# Patient Record
Sex: FEMALE | Race: BLACK OR AFRICAN AMERICAN | NOT HISPANIC OR LATINO | Employment: FULL TIME | URBAN - METROPOLITAN AREA
[De-identification: names, ages, dates, MRNs, and addresses within clinical notes are randomized per-mention and may not be internally consistent; named-entity substitution may affect disease eponyms.]

---

## 2022-02-17 ENCOUNTER — APPOINTMENT (OUTPATIENT)
Dept: GENERAL RADIOLOGY | Facility: HOSPITAL | Age: 62
End: 2022-02-17

## 2022-02-17 ENCOUNTER — HOSPITAL ENCOUNTER (INPATIENT)
Facility: HOSPITAL | Age: 62
LOS: 1 days | Discharge: HOME | End: 2022-02-18
Attending: EMERGENCY MEDICINE | Admitting: INTERNAL MEDICINE

## 2022-02-17 ENCOUNTER — APPOINTMENT (OUTPATIENT)
Dept: CARDIOLOGY | Facility: HOSPITAL | Age: 62
End: 2022-02-17

## 2022-02-17 DIAGNOSIS — I44.2 AV BLOCK, 3RD DEGREE: ICD-10-CM

## 2022-02-17 DIAGNOSIS — I44.2 COMPLETE HEART BLOCK: Primary | ICD-10-CM

## 2022-02-17 LAB
ALBUMIN SERPL-MCNC: 5 G/DL (ref 3.5–5.2)
ALBUMIN/GLOB SERPL: 1.6 G/DL
ALP SERPL-CCNC: 118 U/L (ref 39–117)
ALT SERPL W P-5'-P-CCNC: 23 U/L (ref 1–33)
ANION GAP SERPL CALCULATED.3IONS-SCNC: 11.1 MMOL/L (ref 5–15)
APTT PPP: 27.8 SECONDS (ref 22.7–35.4)
ASCENDING AORTA: 2.2 CM
AST SERPL-CCNC: 17 U/L (ref 1–32)
BASOPHILS # BLD AUTO: 0.03 10*3/MM3 (ref 0–0.2)
BASOPHILS NFR BLD AUTO: 0.5 % (ref 0–1.5)
BH CV ECHO MEAS - ACS: 2.2 CM
BH CV ECHO MEAS - AO MAX PG (FULL): 6 MMHG
BH CV ECHO MEAS - AO MAX PG: 8.9 MMHG
BH CV ECHO MEAS - AO MEAN PG (FULL): 2.8 MMHG
BH CV ECHO MEAS - AO MEAN PG: 4.2 MMHG
BH CV ECHO MEAS - AO ROOT AREA (BSA CORRECTED): 1.5
BH CV ECHO MEAS - AO ROOT AREA: 3.9 CM^2
BH CV ECHO MEAS - AO ROOT DIAM: 2.2 CM
BH CV ECHO MEAS - AO V2 MAX: 149.3 CM/SEC
BH CV ECHO MEAS - AO V2 MEAN: 96.9 CM/SEC
BH CV ECHO MEAS - AO V2 VTI: 36.1 CM
BH CV ECHO MEAS - ASC AORTA: 2.2 CM
BH CV ECHO MEAS - AVA(I,A): 1.8 CM^2
BH CV ECHO MEAS - AVA(I,D): 1.8 CM^2
BH CV ECHO MEAS - AVA(V,A): 1.8 CM^2
BH CV ECHO MEAS - AVA(V,D): 1.8 CM^2
BH CV ECHO MEAS - BSA(HAYCOCK): 1.5 M^2
BH CV ECHO MEAS - BSA: 1.5 M^2
BH CV ECHO MEAS - BZI_BMI: 27.3 KILOGRAMS/M^2
BH CV ECHO MEAS - BZI_METRIC_HEIGHT: 144.8 CM
BH CV ECHO MEAS - BZI_METRIC_WEIGHT: 57.2 KG
BH CV ECHO MEAS - EDV(CUBED): 73.6 ML
BH CV ECHO MEAS - EDV(MOD-SP2): 53 ML
BH CV ECHO MEAS - EDV(MOD-SP4): 52 ML
BH CV ECHO MEAS - EDV(TEICH): 78.2 ML
BH CV ECHO MEAS - EF(CUBED): 63.3 %
BH CV ECHO MEAS - EF(MOD-BP): 67.7 %
BH CV ECHO MEAS - EF(MOD-SP2): 69.8 %
BH CV ECHO MEAS - EF(MOD-SP4): 65.4 %
BH CV ECHO MEAS - EF(TEICH): 55.2 %
BH CV ECHO MEAS - ESV(CUBED): 27 ML
BH CV ECHO MEAS - ESV(MOD-SP2): 16 ML
BH CV ECHO MEAS - ESV(MOD-SP4): 18 ML
BH CV ECHO MEAS - ESV(TEICH): 35 ML
BH CV ECHO MEAS - FS: 28.4 %
BH CV ECHO MEAS - IVS/LVPW: 0.93
BH CV ECHO MEAS - IVSD: 0.88 CM
BH CV ECHO MEAS - LA DIMENSION: 2.7 CM
BH CV ECHO MEAS - LA/AO: 1.2
BH CV ECHO MEAS - LAT PEAK E' VEL: 14.6 CM/SEC
BH CV ECHO MEAS - LV DIASTOLIC VOL/BSA (35-75): 35.2 ML/M^2
BH CV ECHO MEAS - LV MASS(C)D: 121.4 GRAMS
BH CV ECHO MEAS - LV MASS(C)DI: 82.1 GRAMS/M^2
BH CV ECHO MEAS - LV MAX PG: 2.9 MMHG
BH CV ECHO MEAS - LV MEAN PG: 1.4 MMHG
BH CV ECHO MEAS - LV SYSTOLIC VOL/BSA (12-30): 12.2 ML/M^2
BH CV ECHO MEAS - LV V1 MAX: 85.3 CM/SEC
BH CV ECHO MEAS - LV V1 MEAN: 55.6 CM/SEC
BH CV ECHO MEAS - LV V1 VTI: 21.4 CM
BH CV ECHO MEAS - LVIDD: 4.2 CM
BH CV ECHO MEAS - LVIDS: 3 CM
BH CV ECHO MEAS - LVLD AP2: 5.8 CM
BH CV ECHO MEAS - LVLD AP4: 6 CM
BH CV ECHO MEAS - LVLS AP2: 4.6 CM
BH CV ECHO MEAS - LVLS AP4: 4.6 CM
BH CV ECHO MEAS - LVOT AREA (M): 3.1 CM^2
BH CV ECHO MEAS - LVOT AREA: 3.1 CM^2
BH CV ECHO MEAS - LVOT DIAM: 2 CM
BH CV ECHO MEAS - LVPWD: 0.95 CM
BH CV ECHO MEAS - MED PEAK E' VEL: 12.7 CM/SEC
BH CV ECHO MEAS - MV A MAX VEL: 92.1 CM/SEC
BH CV ECHO MEAS - MV DEC SLOPE: 581 CM/SEC^2
BH CV ECHO MEAS - MV DEC TIME: 108 SEC
BH CV ECHO MEAS - MV E MAX VEL: 87.8 CM/SEC
BH CV ECHO MEAS - MV E/A: 0.95
BH CV ECHO MEAS - MV MAX PG: 4.6 MMHG
BH CV ECHO MEAS - MV MEAN PG: 1.6 MMHG
BH CV ECHO MEAS - MV P1/2T MAX VEL: 112.7 CM/SEC
BH CV ECHO MEAS - MV P1/2T: 56.8 MSEC
BH CV ECHO MEAS - MV V2 MAX: 107.2 CM/SEC
BH CV ECHO MEAS - MV V2 MEAN: 55.9 CM/SEC
BH CV ECHO MEAS - MV V2 VTI: 50.3 CM
BH CV ECHO MEAS - MVA P1/2T LCG: 2 CM^2
BH CV ECHO MEAS - MVA(P1/2T): 3.9 CM^2
BH CV ECHO MEAS - MVA(VTI): 1.3 CM^2
BH CV ECHO MEAS - PA ACC TIME: 0.15 SEC
BH CV ECHO MEAS - PA MAX PG (FULL): 2 MMHG
BH CV ECHO MEAS - PA MAX PG: 4.2 MMHG
BH CV ECHO MEAS - PA PR(ACCEL): 11.3 MMHG
BH CV ECHO MEAS - PA V2 MAX: 102 CM/SEC
BH CV ECHO MEAS - PVA(V,A): 3.4 CM^2
BH CV ECHO MEAS - PVA(V,D): 3.4 CM^2
BH CV ECHO MEAS - QP/QS: 1.2
BH CV ECHO MEAS - RAP SYSTOLE: 3 MMHG
BH CV ECHO MEAS - RV MAX PG: 2.1 MMHG
BH CV ECHO MEAS - RV MEAN PG: 0.9 MMHG
BH CV ECHO MEAS - RV V1 MAX: 72.8 CM/SEC
BH CV ECHO MEAS - RV V1 MEAN: 43.1 CM/SEC
BH CV ECHO MEAS - RV V1 VTI: 17 CM
BH CV ECHO MEAS - RVOT AREA: 4.8 CM^2
BH CV ECHO MEAS - RVOT DIAM: 2.5 CM
BH CV ECHO MEAS - RVSP: 31.6 MMHG
BH CV ECHO MEAS - SI(AO): 95.2 ML/M^2
BH CV ECHO MEAS - SI(CUBED): 31.5 ML/M^2
BH CV ECHO MEAS - SI(LVOT): 44.5 ML/M^2
BH CV ECHO MEAS - SI(MOD-SP2): 25 ML/M^2
BH CV ECHO MEAS - SI(MOD-SP4): 23 ML/M^2
BH CV ECHO MEAS - SI(TEICH): 29.2 ML/M^2
BH CV ECHO MEAS - SV(AO): 140.7 ML
BH CV ECHO MEAS - SV(CUBED): 46.6 ML
BH CV ECHO MEAS - SV(LVOT): 65.8 ML
BH CV ECHO MEAS - SV(MOD-SP2): 37 ML
BH CV ECHO MEAS - SV(MOD-SP4): 34 ML
BH CV ECHO MEAS - SV(RVOT): 81.1 ML
BH CV ECHO MEAS - SV(TEICH): 43.2 ML
BH CV ECHO MEAS - TAPSE (>1.6): 2 CM
BH CV ECHO MEAS - TR MAX VEL: 267.4 CM/SEC
BH CV ECHO MEASUREMENTS AVERAGE E/E' RATIO: 6.43
BH CV XLRA - RV BASE: 2.6 CM
BH CV XLRA - RV LENGTH: 5.3 CM
BH CV XLRA - RV MID: 1.8 CM
BH CV XLRA - TDI S': 11.7 CM/SEC
BILIRUB SERPL-MCNC: 0.7 MG/DL (ref 0–1.2)
BUN SERPL-MCNC: 27 MG/DL (ref 8–23)
BUN/CREAT SERPL: 26 (ref 7–25)
CALCIUM SPEC-SCNC: 10 MG/DL (ref 8.6–10.5)
CHLORIDE SERPL-SCNC: 102 MMOL/L (ref 98–107)
CO2 SERPL-SCNC: 24.9 MMOL/L (ref 22–29)
CREAT SERPL-MCNC: 1.04 MG/DL (ref 0.57–1)
DEPRECATED RDW RBC AUTO: 46.2 FL (ref 37–54)
EOSINOPHIL # BLD AUTO: 0.06 10*3/MM3 (ref 0–0.4)
EOSINOPHIL NFR BLD AUTO: 1.1 % (ref 0.3–6.2)
ERYTHROCYTE [DISTWIDTH] IN BLOOD BY AUTOMATED COUNT: 14.3 % (ref 12.3–15.4)
GFR SERPL CREATININE-BSD FRML MDRD: 65 ML/MIN/1.73
GLOBULIN UR ELPH-MCNC: 3.1 GM/DL
GLUCOSE SERPL-MCNC: 174 MG/DL (ref 65–99)
HCT VFR BLD AUTO: 46.1 % (ref 34–46.6)
HGB BLD-MCNC: 14.3 G/DL (ref 12–15.9)
IMM GRANULOCYTES # BLD AUTO: 0.02 10*3/MM3 (ref 0–0.05)
IMM GRANULOCYTES NFR BLD AUTO: 0.4 % (ref 0–0.5)
INR PPP: 1.02 (ref 0.9–1.1)
LEFT ATRIUM VOLUME INDEX: 22.7 ML/M2
LYMPHOCYTES # BLD AUTO: 0.98 10*3/MM3 (ref 0.7–3.1)
LYMPHOCYTES NFR BLD AUTO: 17.9 % (ref 19.6–45.3)
MAGNESIUM SERPL-MCNC: 2.2 MG/DL (ref 1.6–2.4)
MAXIMAL PREDICTED HEART RATE: 157 BPM
MCH RBC QN AUTO: 27.3 PG (ref 26.6–33)
MCHC RBC AUTO-ENTMCNC: 31 G/DL (ref 31.5–35.7)
MCV RBC AUTO: 88 FL (ref 79–97)
MONOCYTES # BLD AUTO: 0.4 10*3/MM3 (ref 0.1–0.9)
MONOCYTES NFR BLD AUTO: 7.3 % (ref 5–12)
NEUTROPHILS NFR BLD AUTO: 3.97 10*3/MM3 (ref 1.7–7)
NEUTROPHILS NFR BLD AUTO: 72.8 % (ref 42.7–76)
NRBC BLD AUTO-RTO: 0 /100 WBC (ref 0–0.2)
PLATELET # BLD AUTO: 205 10*3/MM3 (ref 140–450)
PMV BLD AUTO: 10.5 FL (ref 6–12)
POTASSIUM SERPL-SCNC: 3.9 MMOL/L (ref 3.5–5.2)
PROT SERPL-MCNC: 8.1 G/DL (ref 6–8.5)
PROTHROMBIN TIME: 13.3 SECONDS (ref 11.7–14.2)
QT INTERVAL: 436 MS
QT INTERVAL: 513 MS
RBC # BLD AUTO: 5.24 10*6/MM3 (ref 3.77–5.28)
SARS-COV-2 RNA RESP QL NAA+PROBE: NOT DETECTED
SINUS: 2.2 CM
SODIUM SERPL-SCNC: 138 MMOL/L (ref 136–145)
STJ: 2.2 CM
STRESS TARGET HR: 133 BPM
TROPONIN T SERPL-MCNC: <0.01 NG/ML (ref 0–0.03)
WBC NRBC COR # BLD: 5.46 10*3/MM3 (ref 3.4–10.8)

## 2022-02-17 PROCEDURE — 33208 INSRT HEART PM ATRIAL & VENT: CPT | Performed by: INTERNAL MEDICINE

## 2022-02-17 PROCEDURE — 25010000002 FENTANYL CITRATE (PF) 50 MCG/ML SOLUTION: Performed by: INTERNAL MEDICINE

## 2022-02-17 PROCEDURE — 02HK3JZ INSERTION OF PACEMAKER LEAD INTO RIGHT VENTRICLE, PERCUTANEOUS APPROACH: ICD-10-PCS | Performed by: INTERNAL MEDICINE

## 2022-02-17 PROCEDURE — 99152 MOD SED SAME PHYS/QHP 5/>YRS: CPT | Performed by: INTERNAL MEDICINE

## 2022-02-17 PROCEDURE — 99223 1ST HOSP IP/OBS HIGH 75: CPT | Performed by: NURSE PRACTITIONER

## 2022-02-17 PROCEDURE — 25010000002 MIDAZOLAM PER 1 MG: Performed by: INTERNAL MEDICINE

## 2022-02-17 PROCEDURE — C1894 INTRO/SHEATH, NON-LASER: HCPCS | Performed by: INTERNAL MEDICINE

## 2022-02-17 PROCEDURE — C1785 PMKR, DUAL, RATE-RESP: HCPCS | Performed by: INTERNAL MEDICINE

## 2022-02-17 PROCEDURE — 84484 ASSAY OF TROPONIN QUANT: CPT | Performed by: NURSE PRACTITIONER

## 2022-02-17 PROCEDURE — 83735 ASSAY OF MAGNESIUM: CPT | Performed by: EMERGENCY MEDICINE

## 2022-02-17 PROCEDURE — 85730 THROMBOPLASTIN TIME PARTIAL: CPT | Performed by: EMERGENCY MEDICINE

## 2022-02-17 PROCEDURE — 93005 ELECTROCARDIOGRAM TRACING: CPT | Performed by: NURSE PRACTITIONER

## 2022-02-17 PROCEDURE — G0378 HOSPITAL OBSERVATION PER HR: HCPCS

## 2022-02-17 PROCEDURE — 93306 TTE W/DOPPLER COMPLETE: CPT

## 2022-02-17 PROCEDURE — 93010 ELECTROCARDIOGRAM REPORT: CPT | Performed by: INTERNAL MEDICINE

## 2022-02-17 PROCEDURE — C1887 CATHETER, GUIDING: HCPCS | Performed by: INTERNAL MEDICINE

## 2022-02-17 PROCEDURE — C1898 LEAD, PMKR, OTHER THAN TRANS: HCPCS | Performed by: INTERNAL MEDICINE

## 2022-02-17 PROCEDURE — 71045 X-RAY EXAM CHEST 1 VIEW: CPT

## 2022-02-17 PROCEDURE — C1769 GUIDE WIRE: HCPCS | Performed by: INTERNAL MEDICINE

## 2022-02-17 PROCEDURE — 02H63JZ INSERTION OF PACEMAKER LEAD INTO RIGHT ATRIUM, PERCUTANEOUS APPROACH: ICD-10-PCS | Performed by: INTERNAL MEDICINE

## 2022-02-17 PROCEDURE — 80053 COMPREHEN METABOLIC PANEL: CPT | Performed by: NURSE PRACTITIONER

## 2022-02-17 PROCEDURE — 85610 PROTHROMBIN TIME: CPT | Performed by: NURSE PRACTITIONER

## 2022-02-17 PROCEDURE — 25010000002 VANCOMYCIN PER 500 MG: Performed by: INTERNAL MEDICINE

## 2022-02-17 PROCEDURE — 99153 MOD SED SAME PHYS/QHP EA: CPT | Performed by: INTERNAL MEDICINE

## 2022-02-17 PROCEDURE — 0JH606Z INSERTION OF PACEMAKER, DUAL CHAMBER INTO CHEST SUBCUTANEOUS TISSUE AND FASCIA, OPEN APPROACH: ICD-10-PCS | Performed by: INTERNAL MEDICINE

## 2022-02-17 PROCEDURE — 93306 TTE W/DOPPLER COMPLETE: CPT | Performed by: INTERNAL MEDICINE

## 2022-02-17 PROCEDURE — 99284 EMERGENCY DEPT VISIT MOD MDM: CPT

## 2022-02-17 PROCEDURE — U0003 INFECTIOUS AGENT DETECTION BY NUCLEIC ACID (DNA OR RNA); SEVERE ACUTE RESPIRATORY SYNDROME CORONAVIRUS 2 (SARS-COV-2) (CORONAVIRUS DISEASE [COVID-19]), AMPLIFIED PROBE TECHNIQUE, MAKING USE OF HIGH THROUGHPUT TECHNOLOGIES AS DESCRIBED BY CMS-2020-01-R: HCPCS | Performed by: EMERGENCY MEDICINE

## 2022-02-17 PROCEDURE — 85025 COMPLETE CBC W/AUTO DIFF WBC: CPT | Performed by: NURSE PRACTITIONER

## 2022-02-17 PROCEDURE — 93005 ELECTROCARDIOGRAM TRACING: CPT

## 2022-02-17 DEVICE — IMPLANTABLE DEVICE: Type: IMPLANTABLE DEVICE | Status: FUNCTIONAL

## 2022-02-17 DEVICE — LD PM CAPSUREFIX NOVUS5076 52CM: Type: IMPLANTABLE DEVICE | Status: FUNCTIONAL

## 2022-02-17 DEVICE — GEN PM AZURE XT SURESCAN DR MRI: Type: IMPLANTABLE DEVICE | Status: FUNCTIONAL

## 2022-02-17 RX ORDER — ACETAMINOPHEN 650 MG/1
650 SUPPOSITORY RECTAL EVERY 4 HOURS PRN
Status: DISCONTINUED | OUTPATIENT
Start: 2022-02-17 | End: 2022-02-18 | Stop reason: HOSPADM

## 2022-02-17 RX ORDER — MIDAZOLAM HYDROCHLORIDE 1 MG/ML
INJECTION INTRAMUSCULAR; INTRAVENOUS AS NEEDED
Status: DISCONTINUED | OUTPATIENT
Start: 2022-02-17 | End: 2022-02-17 | Stop reason: HOSPADM

## 2022-02-17 RX ORDER — VANCOMYCIN HYDROCHLORIDE 1 G/200ML
INJECTION, SOLUTION INTRAVENOUS CONTINUOUS PRN
Status: COMPLETED | OUTPATIENT
Start: 2022-02-17 | End: 2022-02-17

## 2022-02-17 RX ORDER — LIDOCAINE HYDROCHLORIDE AND EPINEPHRINE 10; 10 MG/ML; UG/ML
INJECTION, SOLUTION INFILTRATION; PERINEURAL AS NEEDED
Status: DISCONTINUED | OUTPATIENT
Start: 2022-02-17 | End: 2022-02-17 | Stop reason: HOSPADM

## 2022-02-17 RX ORDER — SODIUM CHLORIDE 0.9 % (FLUSH) 0.9 %
10 SYRINGE (ML) INJECTION EVERY 12 HOURS SCHEDULED
Status: DISCONTINUED | OUTPATIENT
Start: 2022-02-17 | End: 2022-02-18 | Stop reason: HOSPADM

## 2022-02-17 RX ORDER — LISINOPRIL 20 MG/1
20 TABLET ORAL DAILY
Status: DISCONTINUED | OUTPATIENT
Start: 2022-02-17 | End: 2022-02-18 | Stop reason: HOSPADM

## 2022-02-17 RX ORDER — NALOXONE HCL 0.4 MG/ML
0.4 VIAL (ML) INJECTION
Status: DISCONTINUED | OUTPATIENT
Start: 2022-02-17 | End: 2022-02-18 | Stop reason: HOSPADM

## 2022-02-17 RX ORDER — HYDROCHLOROTHIAZIDE 25 MG/1
25 TABLET ORAL DAILY
Status: DISCONTINUED | OUTPATIENT
Start: 2022-02-17 | End: 2022-02-18 | Stop reason: HOSPADM

## 2022-02-17 RX ORDER — HYDROCODONE BITARTRATE AND ACETAMINOPHEN 5; 325 MG/1; MG/1
1 TABLET ORAL EVERY 4 HOURS PRN
Status: DISCONTINUED | OUTPATIENT
Start: 2022-02-17 | End: 2022-02-18 | Stop reason: HOSPADM

## 2022-02-17 RX ORDER — HYDROCHLOROTHIAZIDE 25 MG/1
25 TABLET ORAL DAILY
COMMUNITY

## 2022-02-17 RX ORDER — HYDROMORPHONE HYDROCHLORIDE 1 MG/ML
0.5 INJECTION, SOLUTION INTRAMUSCULAR; INTRAVENOUS; SUBCUTANEOUS
Status: DISCONTINUED | OUTPATIENT
Start: 2022-02-17 | End: 2022-02-18 | Stop reason: HOSPADM

## 2022-02-17 RX ORDER — ACETAMINOPHEN 325 MG/1
650 TABLET ORAL EVERY 4 HOURS PRN
Status: DISCONTINUED | OUTPATIENT
Start: 2022-02-17 | End: 2022-02-18 | Stop reason: HOSPADM

## 2022-02-17 RX ORDER — GLIPIZIDE 5 MG/1
2.5 TABLET ORAL
Status: DISCONTINUED | OUTPATIENT
Start: 2022-02-17 | End: 2022-02-18 | Stop reason: HOSPADM

## 2022-02-17 RX ORDER — NITROGLYCERIN 0.4 MG/1
0.4 TABLET SUBLINGUAL
Status: DISCONTINUED | OUTPATIENT
Start: 2022-02-17 | End: 2022-02-18 | Stop reason: HOSPADM

## 2022-02-17 RX ORDER — QUINAPRIL 20 MG/1
20 TABLET ORAL NIGHTLY
COMMUNITY

## 2022-02-17 RX ORDER — FENTANYL CITRATE 50 UG/ML
INJECTION, SOLUTION INTRAMUSCULAR; INTRAVENOUS AS NEEDED
Status: DISCONTINUED | OUTPATIENT
Start: 2022-02-17 | End: 2022-02-17 | Stop reason: HOSPADM

## 2022-02-17 RX ORDER — GLIPIZIDE 5 MG/1
5 TABLET, FILM COATED, EXTENDED RELEASE ORAL DAILY
COMMUNITY

## 2022-02-17 RX ORDER — GLIPIZIDE 5 MG/1
5 TABLET ORAL 2 TIMES DAILY
Status: ON HOLD | COMMUNITY
End: 2022-02-17

## 2022-02-17 RX ORDER — SODIUM CHLORIDE 0.9 % (FLUSH) 0.9 %
10 SYRINGE (ML) INJECTION AS NEEDED
Status: DISCONTINUED | OUTPATIENT
Start: 2022-02-17 | End: 2022-02-18 | Stop reason: HOSPADM

## 2022-02-17 RX ADMIN — SODIUM CHLORIDE, PRESERVATIVE FREE 10 ML: 5 INJECTION INTRAVENOUS at 22:17

## 2022-02-17 RX ADMIN — ACETAMINOPHEN 650 MG: 325 TABLET ORAL at 20:23

## 2022-02-17 NOTE — PROGRESS NOTES
Clinical Pharmacy Services: Medication History    Ayanna Nicole is a 61 y.o. female presenting to [facility] for   Chief Complaint   Patient presents with   • Slow Heart Rate       She  has no past medical history on file.    Allergies as of 02/17/2022   • (No Known Allergies)       Medication information was obtained from: Patient and Pharmacy  Pharmacy and Phone Number:   [id number] - [place], [place] - [address] - [phone]  - [phone]   [address] [address]  Phone: [phone] Fax: [phone]    [facility] [place]  [address] [place] [id number]  Phone: [phone] Fax: [phone]        Prior to Admission Medications     Prescriptions Last Dose Informant Patient Reported? Taking?    glipizide (GLUCOTROL XL) 5 MG ER tablet  Pharmacy Yes Yes    Take 5 mg by mouth Daily.    hydroCHLOROthiazide (HYDRODIURIL) 25 MG tablet  Pharmacy Yes Yes    Take 25 mg by mouth Daily.    quinapril (ACCUPRIL) 20 MG tablet  Pharmacy Yes Yes    Take 20 mg by mouth Every Night.            Medication notes: [facility] verified the patient picked up 30-day supplies of Glipizide ER 5 mg QD on 2/11 and Hydrochlorothiazide 25 mg QD on 2/11, and a 90-day supply of Quinopril 20 mg QD on 12/13/21.    This medication list is complete to the best of my knowledge as of 2/17/2022    Please call if questions.    Eden Martínez  Medication History Technician  [phone]    2/17/2022 16:05 EST

## 2022-02-17 NOTE — H&P
Electrophysiology History & Physical    Date of Hospital Visit: 2022 12:16 PM  Encounter Provider: CHASE Parker  Place of Service: [facility] CARDIOLOGY  Patient Name: Ayanna Nicole  :1960  Referral Provider: No ref. provider found      Chief complaint: Low heart rate noted at PCP office today    History of Present Illness     61 yr old patient with no prior cardiac history.  She does have hypertension and diabetes.  She went to see her primary care today for routine visit and refills on her medication, they noted that her heart rate was in the 40s and they did an EKG and sent her to the emergency room.    She was found to be in complete heart block with a right bundle branch block.    Dr. Ashraf and I saw her in the emergency room, she has had no chest pain perhaps some more fatigue and shortness of breath with some walks lately but has not noted that her heart rate was slow.    She has had times where she feels like that her heart rate is fast but she thinks that that is due to anxiety because she lost her  last year and she is still having a really hard time with this.    No past medical history on file.      No past surgical history on file.    No family history on file.    Medications Prior to Admission   Medication Sig Dispense Refill Last Dose   • glipizide (GLUCOTROL XL) 5 MG ER tablet Take 5 mg by mouth Daily.      • hydroCHLOROthiazide (HYDRODIURIL) 25 MG tablet Take 25 mg by mouth Daily.      • quinapril (ACCUPRIL) 20 MG tablet Take 20 mg by mouth Every Night.          Current Meds  No current facility-administered medications on file prior to encounter.     Current Outpatient Medications on File Prior to Encounter   Medication Sig Dispense Refill   • glipizide (GLUCOTROL XL) 5 MG ER tablet Take 5 mg by mouth Daily.     • hydroCHLOROthiazide (HYDRODIURIL) 25 MG tablet Take 25 mg by mouth Daily.     • quinapril (ACCUPRIL) 20 MG tablet  "Take 20 mg by mouth Every Night.     • [DISCONTINUED] glipizide (GLUCOTROL) 5 MG tablet Take 5 mg by mouth 2 (Two) Times a Day.           Social History     Socioeconomic History   • Marital status:          REVIEW OF SYSTEMS: All systems reviewed. Pertinent positives identified in HPI. All other systems are negative.     12 point ROS was performed and is negative except as outlined in HPI            Objective:     Vitals:    02/17/22 1621 02/17/22 1626 02/17/22 1631 02/17/22 1636   BP:       BP Location:       Patient Position:       Pulse:       Resp: 19 18 20 22   Temp:       SpO2:       Weight:       Height:         Body mass index is 25.45 kg/m².  Flowsheet Rows      First Filed Value   Admission Height 149.9 cm (59\") Documented at 02/17/2022 1456   Admission Weight 57.2 kg (126 lb) Documented at 02/17/2022 1456          PHYSICAL EXAM:  Vitals reviewed.   Constitutional:       Appearance: Healthy appearance. Not in distress.   Pulmonary:      Effort: Pulmonary effort is normal.      Breath sounds: Normal breath sounds.   Cardiovascular:      PMI at left midclavicular line. Bradycardia present. Regular rhythm. Normal S1.   Edema:     Peripheral edema absent.   Abdominal:      General: There is no distension.   Musculoskeletal: Normal range of motion.      Cervical back: Normal range of motion and neck supple. Skin:     General: Skin is warm and dry.   Neurological:      Mental Status: Alert and oriented to person, place and time.                                                                              Lab Review:  Results from last 7 days   Lab Units 02/17/22  1251   SODIUM mmol/L 138   POTASSIUM mmol/L 3.9   CHLORIDE mmol/L 102   CO2 mmol/L 24.9   BUN mg/dL 27*   CREATININE mg/dL 1.04*   GLUCOSE mg/dL 174*   CALCIUM mg/dL 10.0   AST (SGOT) U/L 17   ALT (SGPT) U/L 23     Results from last 7 days   Lab Units 02/17/22  1251   TROPONIN T ng/mL <0.010     Results from last 7 days   Lab Units " 02/17/22  1251   WBC 10*3/mm3 5.46   HEMOGLOBIN g/dL 14.3   HEMATOCRIT % 46.1   PLATELETS 10*3/mm3 205     Results from last 7 days   Lab Units 02/17/22  1251   INR  1.02   APTT seconds 27.8         Results from last 7 days   Lab Units 02/17/22  1251   MAGNESIUM mg/dL 2.2       Imaging:     Echo pending but reviewed images while tech was doing at bedside and EF ok.     EKG:         I personally viewed and interpreted the patient's EKG/Telemetry tracings    Assessment:      Complete heart block (HCC)    AV block, 3rd degree (HCC)       Plan:       Dr. Ashraf and I saw her in the emergency room, she was in complete heart block, she has had some increased fatigue and mild shortness of breath recently.    Was getting an echocardiogram at the time we saw her, results are pending however we did review the images and her EF is normal.    Recommended proceeding with a pacemaker, risk and benefits discussed and she is agreeable.    We will proceed with pacemaker this afternoon.    CHASE Parker  [facility]  02/17/22  16:40 EST

## 2022-02-17 NOTE — ED TRIAGE NOTES
Pt was sent over by pmd for decreased heart rate. Pt states that she went for physical and found her heart rate at 42. Pt denies chest pain, soa, dizziness or weakness    Pt wearing mask on arrival. Staff wearing mask and goggles at time of triage.

## 2022-02-17 NOTE — ED PROVIDER NOTES
EMERGENCY DEPARTMENT ENCOUNTER    Room Number:  2202/1  Date seen:  2022  Time seen: 12:19 EST  PCP: Provider, No Known  Historian: patient    HPI:  Chief complaint:sent by PCP for abnormal EKG  A complete HPI/ROS/PMH/PSH/SH/FH are unobtainable due to: n/a  Context:Ayanna Nicole is a 61 y.o. female who presents to the ED with c/o low heart rate noticed by her PCP when she went there today for medication refills.  She had EKG there showing HR 42.  She has not had this problem.  She denies: chest pain, chest tightness, chest pressure.  No SOA, diaphoresis or n/v. States she has had some complicated grief over the past several months due to her  dying of aneurysm, suddenly.  Also, has multiple other family members who have .      Patient was placed in face mask in first look. Patient was wearing facemask when I entered the room and throughout our encounter. I wore full protective equipment throughout this patient encounter including a N95 face mask, eye shield and gloves. Hand hygiene/washing of hands was performed before donning protective equipment and after removal when leaving the room.    MEDICAL RECORD REVIEW    ALLERGIES  Patient has no known allergies.    PAST MEDICAL HISTORY  Active Ambulatory Problems     Diagnosis Date Noted   • No Active Ambulatory Problems     Resolved Ambulatory Problems     Diagnosis Date Noted   • No Resolved Ambulatory Problems     No Additional Past Medical History       PAST SURGICAL HISTORY  History reviewed. No pertinent surgical history.    FAMILY HISTORY  Family History   Problem Relation Age of Onset   • Diabetes Mother    • Heart disease Father        SOCIAL HISTORY  Social History     Socioeconomic History   • Marital status:    Tobacco Use   • Smoking status: Former Smoker     Quit date: 1990     Years since quittin.0   • Smokeless tobacco: Never Used   Vaping Use   • Vaping Use: Never used   Substance and Sexual Activity   • Alcohol use: Not  Currently   • Drug use: Never   • Sexual activity: Defer     Partners: Male       REVIEW OF SYSTEMS  Review of Systems    All systems reviewed and negative except for those discussed in HPI.     PHYSICAL EXAM    ED Triage Vitals [02/17/22 1215]   Temp Heart Rate Resp BP SpO2   97.7 °F (36.5 °C) (!) 39 18 (!) 194/66 99 %      Temp src Heart Rate Source Patient Position BP Location FiO2 (%)   -- -- Standing Right arm --     Physical Exam    I have reviewed the triage vital signs and nursing notes.      GENERAL: not distressed, tearful, anxious  HENT: nares patent  EYES: no scleral icterus  NECK: no ROM limitations  CV: regular rhythm, bradycardic, no murmur, no rubs, no gallups  RESPIRATORY: normal effort, CTAB  ABDOMEN: soft  : deferred  MUSCULOSKELETAL: no deformity, no lower extremity.   NEURO: alert, moves all extremities, follows commands  SKIN: warm, dry    Critical Care  Performed by: Tahmina Valdovinos APRN  Authorized by: Jamshid Acharya MD     Critical care provider statement:     Critical care time (minutes): 30-74.    Critical care was necessary to treat or prevent imminent or life-threatening deterioration of the following conditions:  Cardiac failure    Critical care was time spent personally by me on the following activities:  Blood draw for specimens, development of treatment plan with patient or surrogate, discussions with consultants, discussions with primary provider, evaluation of patient's response to treatment, examination of patient, obtaining history from patient or surrogate, ordering and performing treatments and interventions, ordering and review of laboratory studies, ordering and review of radiographic studies, pulse oximetry, re-evaluation of patient's condition and review of old charts        LAB RESULTS  Recent Results (from the past 24 hour(s))   ECG 12 Lead    Collection Time: 02/17/22 12:22 PM   Result Value Ref Range    QT Interval 513 ms   Comprehensive Metabolic Panel     Collection Time: 02/17/22 12:51 PM    Specimen: Blood   Result Value Ref Range    Glucose 174 (H) 65 - 99 mg/dL    BUN 27 (H) 8 - 23 mg/dL    Creatinine 1.04 (H) 0.57 - 1.00 mg/dL    Sodium 138 136 - 145 mmol/L    Potassium 3.9 3.5 - 5.2 mmol/L    Chloride 102 98 - 107 mmol/L    CO2 24.9 22.0 - 29.0 mmol/L    Calcium 10.0 8.6 - 10.5 mg/dL    Total Protein 8.1 6.0 - 8.5 g/dL    Albumin 5.00 3.50 - 5.20 g/dL    ALT (SGPT) 23 1 - 33 U/L    AST (SGOT) 17 1 - 32 U/L    Alkaline Phosphatase 118 (H) 39 - 117 U/L    Total Bilirubin 0.7 0.0 - 1.2 mg/dL    eGFR  African Amer 65 >60 mL/min/1.73    Globulin 3.1 gm/dL    A/G Ratio 1.6 g/dL    BUN/Creatinine Ratio 26.0 (H) 7.0 - 25.0    Anion Gap 11.1 5.0 - 15.0 mmol/L   Protime-INR    Collection Time: 02/17/22 12:51 PM    Specimen: Blood   Result Value Ref Range    Protime 13.3 11.7 - 14.2 Seconds    INR 1.02 0.90 - 1.10   Troponin    Collection Time: 02/17/22 12:51 PM    Specimen: Blood   Result Value Ref Range    Troponin T <0.010 0.000 - 0.030 ng/mL   CBC Auto Differential    Collection Time: 02/17/22 12:51 PM    Specimen: Blood   Result Value Ref Range    WBC 5.46 3.40 - 10.80 10*3/mm3    RBC 5.24 3.77 - 5.28 10*6/mm3    Hemoglobin 14.3 12.0 - 15.9 g/dL    Hematocrit 46.1 34.0 - 46.6 %    MCV 88.0 79.0 - 97.0 fL    MCH 27.3 26.6 - 33.0 pg    MCHC 31.0 (L) 31.5 - 35.7 g/dL    RDW 14.3 12.3 - 15.4 %    RDW-SD 46.2 37.0 - 54.0 fl    MPV 10.5 6.0 - 12.0 fL    Platelets 205 140 - 450 10*3/mm3    Neutrophil % 72.8 42.7 - 76.0 %    Lymphocyte % 17.9 (L) 19.6 - 45.3 %    Monocyte % 7.3 5.0 - 12.0 %    Eosinophil % 1.1 0.3 - 6.2 %    Basophil % 0.5 0.0 - 1.5 %    Immature Grans % 0.4 0.0 - 0.5 %    Neutrophils, Absolute 3.97 1.70 - 7.00 10*3/mm3    Lymphocytes, Absolute 0.98 0.70 - 3.10 10*3/mm3    Monocytes, Absolute 0.40 0.10 - 0.90 10*3/mm3    Eosinophils, Absolute 0.06 0.00 - 0.40 10*3/mm3    Basophils, Absolute 0.03 0.00 - 0.20 10*3/mm3    Immature Grans, Absolute 0.02 0.00 -  0.05 10*3/mm3    nRBC 0.0 0.0 - 0.2 /100 WBC   Magnesium    Collection Time: 02/17/22 12:51 PM    Specimen: Blood   Result Value Ref Range    Magnesium 2.2 1.6 - 2.4 mg/dL   aPTT    Collection Time: 02/17/22 12:51 PM    Specimen: Blood   Result Value Ref Range    PTT 27.8 22.7 - 35.4 seconds   COVID-19,[facility] [place] IN-HOUSE CEPHEID/MANISH NP SWAB IN TRANSPORT MEDIA 8-12 HR TAT - Swab, Nasopharynx    Collection Time: 02/17/22 12:52 PM    Specimen: Nasopharynx; Swab   Result Value Ref Range    COVID19 Not Detected Not Detected - Ref. Range   Adult Transthoracic Echo Complete W/ Cont if Necessary Per Protocol    Collection Time: 02/17/22  3:42 PM   Result Value Ref Range    BSA 1.5 m^2    IVSd 0.88 cm    LVIDd 4.2 cm    LVIDs 3.0 cm    LVPWd 0.95 cm    IVS/LVPW 0.93     FS 28.4 %    EDV(Teich) 78.2 ml    ESV(Teich) 35.0 ml    EF(Teich) 55.2 %    EDV(cubed) 73.6 ml    ESV(cubed) 27.0 ml    EF(cubed) 63.3 %    LV mass(C)d 121.4 grams    LV mass(C)dI 82.1 grams/m^2    SV(Teich) 43.2 ml    SI(Teich) 29.2 ml/m^2    SV(cubed) 46.6 ml    SI(cubed) 31.5 ml/m^2    Ao root diam 2.2 cm    Ao root area 3.9 cm^2    ACS 2.2 cm    LA dimension 2.7 cm    asc Aorta Diam 2.2 cm    LA/Ao 1.2     LVOT diam 2.0 cm    LVOT area 3.1 cm^2    LVOT area(traced) 3.1 cm^2    RVOT diam 2.5 cm    RVOT area 4.8 cm^2    LVLd ap4 6.0 cm    EDV(MOD-sp4) 52.0 ml    LVLs ap4 4.6 cm    ESV(MOD-sp4) 18.0 ml    EF(MOD-sp4) 65.4 %    LVLd ap2 5.8 cm    EDV(MOD-sp2) 53.0 ml    LVLs ap2 4.6 cm    ESV(MOD-sp2) 16.0 ml    EF(MOD-sp2) 69.8 %    SV(MOD-sp4) 34.0 ml    SI(MOD-sp4) 23.0 ml/m^2    SV(MOD-sp2) 37.0 ml    SI(MOD-sp2) 25.0 ml/m^2    Ao root area (BSA corrected) 1.5     LV Spirng Vol (BSA corrected) 35.2 ml/m^2    LV Sys Vol (BSA corrected) 12.2 ml/m^2    TAPSE (>1.6) 2.0 cm    EF(MOD-bp) 67.7 %    MV E max mckinley 87.8 cm/sec    MV A max mckinley 92.1 cm/sec    MV E/A 0.95     MV V2 max 107.2 cm/sec    MV max PG 4.6 mmHg    MV V2 mean 55.9 cm/sec    MV mean PG 1.6 mmHg     MV V2 VTI 50.3 cm    MVA(VTI) 1.3 cm^2    MV P1/2t max bg 112.7 cm/sec    MV P1/2t 56.8 msec    MVA(P1/2t) 3.9 cm^2    MV dec slope 581.0 cm/sec^2    MV dec time 108 sec    Ao pk bg 149.3 cm/sec    Ao max PG 8.9 mmHg    Ao max PG (full) 6.0 mmHg    Ao V2 mean 96.9 cm/sec    Ao mean PG 4.2 mmHg    Ao mean PG (full) 2.8 mmHg    Ao V2 VTI 36.1 cm    TRAVIS(I,A) 1.8 cm^2    TRAVIS(I,D) 1.8 cm^2    TRAVIS(V,A) 1.8 cm^2    TRAVIS(V,D) 1.8 cm^2    LV V1 max PG 2.9 mmHg    LV V1 mean PG 1.4 mmHg    LV V1 max 85.3 cm/sec    LV V1 mean 55.6 cm/sec    LV V1 VTI 21.4 cm    SV(Ao) 140.7 ml    SI(Ao) 95.2 ml/m^2    SV(LVOT) 65.8 ml    SV(RVOT) 81.1 ml    SI(LVOT) 44.5 ml/m^2    PA V2 max 102.0 cm/sec    PA max PG 4.2 mmHg    PA max PG (full) 2.0 mmHg     CV ECHO IAIN - PVA(V,A) 3.4 cm^2     CV ECHO IAIN - PVA(V,D) 3.4 cm^2    PA acc time 0.15 sec    RV V1 max PG 2.1 mmHg    RV V1 mean PG 0.9 mmHg    RV V1 max 72.8 cm/sec    RV V1 mean 43.1 cm/sec    RV V1 VTI 17.0 cm    TR max bg 267.4 cm/sec    RVSP(TR) 31.6 mmHg    RAP systole 3.0 mmHg    PA pr(Accel) 11.3 mmHg    Qp/Qs 1.2     MVA P1/2T LCG 2.0 cm^2    RV Base 2.6 cm    RV Length 5.3 cm    RV Mid 1.8 cm    Lat Peak E' Bg 14.6 cm/sec    Med Peak E' Bg 12.7 cm/sec    RV S' 11.7 cm/sec     CV ECHO IAIN - BZI_BMI 27.3 kilograms/m^2     CV ECHO IAIN - BSA([facility]) 1.5 m^2     CV ECHO IAIN - BZI_METRIC_WEIGHT 57.2 kg     CV ECHO IAIN - BZI_METRIC_HEIGHT 144.8 cm    Avg E/e' ratio 6.43     Sinus 2.2 cm    STJ 2.2 cm    Ascending aorta 2.2 cm    LA Volume Index 22.7 mL/m2    Target HR (85%) 133 bpm    Max. Pred. HR (100%) 157 bpm   ECG 12 Lead    Collection Time: 02/17/22  6:20 PM   Result Value Ref Range    QT Interval 436 ms   Basic Metabolic Panel    Collection Time: 02/18/22  5:07 AM    Specimen: Blood   Result Value Ref Range    Glucose 93 65 - 99 mg/dL    BUN 18 8 - 23 mg/dL    Creatinine 0.85 0.57 - 1.00 mg/dL    Sodium 139 136 - 145 mmol/L    Potassium 3.7 3.5 - 5.2  mmol/L    Chloride 103 98 - 107 mmol/L    CO2 24.0 22.0 - 29.0 mmol/L    Calcium 9.3 8.6 - 10.5 mg/dL    eGFR  African Amer 82 >60 mL/min/1.73    BUN/Creatinine Ratio 21.2 7.0 - 25.0    Anion Gap 12.0 5.0 - 15.0 mmol/L   ECG 12 Lead    Collection Time: 02/18/22  5:23 AM   Result Value Ref Range    QT Interval 508 ms         RADIOLOGY RESULTS  Adult Transthoracic Echo Complete W/ Cont if Necessary Per Protocol    Result Date: 2/17/2022  · Calculated left ventricular EF = 67.7% Estimated left ventricular EF was in agreement with the calculated left ventricular EF. Left ventricular systolic function is normal. · Left ventricular diastolic function was normal.      XR Chest 1 View    Result Date: 2/17/2022  ONE VIEW PORTABLE CHEST  HISTORY: Shortness of breath.  FINDINGS: The lungs are well-expanded and clear and the heart size is normal. There is no acute disease.  This report was finalized on 2/17/2022 1:31 PM by Dr. Spenser Kern M.D.      EP/CRM Study    Result Date: 2/17/2022  Implantation of a dual-chamber pacemaker Pre-op diagnosis: Complete heart block Postop diagnosis: Same The staff prepped and draped the patient in usual sterile fashion.  I infiltrated with 1% lidocaine with epinephrine.  I then gained access to the axillary vein using ultrasound guidance and micropuncture x2. I then made a 5 cm incision and brought the leads through the pocket.  I then made a pocket in the prepectoralis space. I then used a 9 Tajik sheath to introduce the Medtronic  sheath.  I used a 3830-lead placed in the septal position and actively fixated in the septum through to the left bundle branch area.  I confirmed this with a right bundle branch morphology on the paced QRS.  Pacing and sensing thresholds were good.  I then cut away the  sheath and peeled away the 9 Tajik sheath and tied the lead down using 2-0 silk ligature and its respective anchoring sleeve. I then used a 7 Tajik sheath to place the atrial lead.   This was a Medtronic 5076 active-fixation lead. I placed just in the anterior right atrium and actively fixated the lead and observed good injury current and good pacing and sensing thresholds and then tied the lead down using 2-0 silk ligature in its respective anchoring sleeve. I then connected the 2 leads to the pulse generator.  I placed it into the pocket.  I tied it down with 2-0 silk ligature and its respective anchoring sleeve. I then irrigated with vancomycin containing saline and closed with 2-0 Vicryl 2-0 Vicryl and 4-0 Vicryl. Complications: None         PROGRESS, DATA ANALYSIS, CONSULTS AND MEDICAL DECISION MAKING  All labs have been independently reviewed by me.  All radiology studies have been reviewed by me and discussed with radiologist dictating the report.  EKG's independently viewed and interpreted by me unless stated otherwise. Discussion below represents my analysis of pertinent findings related to patient's condition, differential diagnosis, treatment plan and final disposition.     ED Course as of 02/18/22 0834   Thu Feb 17, 2022   1230 EKG     Viewed by ER MD prior to my interpretation     EKG time: 1221  Rhythm/Rate: 44, complete heart block  P waves and FL: variable  QRS, axis: wide  ST and T waves: no acute ST/T wave abnormalities     Interpreted Contemporaneously by me, independently viewed  No prior available for comparison   [EW]   1338 I viewed CXR in PACs. My interpretation is no acute infiltrates.  [EW]   1438 Discussed patient with Dr. Garcia, ZANDRA, He will discuss with EP provider and call me back.  [EW]   1538 Dr. Cowan with [facility] here.  ECHO being performed at bedside.  Dr. Ashraf with EP is here.  He will take patient for pacemaker.  [EW]      ED Course User Index  [EW] Tahmina Valdovinos, CHASE     DDX: complete heart block, CHF, STEMI, electrolyte abnormality, Takosubo cardiomyopathy    MDM:  Pt was seen by Dr. Go with [facility]DRA and Dr. Ashraf and was taken to  "cath lab for pacemaker insertion.  Echo was performed in ED as emergent basis.  She had no symptoms other than low heart rate. Lab workup unremarkable     Reviewed pt's history and workup with Dr. Acharya.  After a bedside evaluation, Dr. Acharya agrees with the plan of care.       Based on the patient's lab findings and presenting symptoms, the doctor and I feel it is appropriate to admit the patient for further management, evaluation, and treatment.  I have discussed this with the admitting team.  I have also discussed this with the patient/family.  They are in agreement with admission.          Disposition vitals:  /98 (BP Location: Right arm, Patient Position: Sitting)   Pulse 103   Temp 97.9 °F (36.6 °C) (Oral)   Resp 16   Ht 149.9 cm (59\")   Wt 57.2 kg (126 lb)   SpO2 100%   BMI 25.45 kg/m²       DIAGNOSIS  Final diagnoses:   AV block, 3rd degree (HCC)       Admission     Bonifacio Tahminajohnson Tovar, APRVAMSI  02/18/22 0838    "

## 2022-02-17 NOTE — ED PROVIDER NOTES
MD ATTESTATION NOTE  I wore full protective equipment throughout this patient encounter including a N95 face mask, googles, gown and gloves. Hand hygiene was performed before donning protective equipment and after removal when leaving the room.    The ESTEBAN and I have discussed this patient's history, physical exam, and treatment plan. I have reviewed the documentation and personally had a face to face interaction with the patient. I affirm the ESTEBAN documentation and agree with their diagnostics, findings, treatment, plan, and disposition.    I provided a substantive portion of the care of this patient.  I personally performed the physical exam, in its entirety.  The attached note describes my personal findings.    Ayanna Nicole is a 61 y.o. female who presents to the ED c/o slow heart rate.  Patient reports that she had routine visit with her primary care today, was there for medication refills.  Patient reports that while she was there her primary care noticed her heart rate was 42.  Patient sent to emergency department for evaluation.  Patient denies any chest pain or shortness of breath.  Patient does report occasional dyspnea on exertion, denies any lightheadedness, has been feeling more fatigued.  Patient denies any cardiac history.  Patient does report that her   recently and she has been extremely upset about this.     On exam:  General: NAD.  Head: NCAT.  ENT: nares patent, no scleral icterus  Neck: Supple, trachea midline.  Cardiac: Bradycardic rate and regular rhythm.  Lungs: normal effort.  Abdomen: Soft, NTTP.   Extremities: Moves all extremities well, no peripheral edema  Neuro: alert, MAEW, follows commands  Psych: calm, cooperative  Skin: Warm, dry.    Medical Decision Making:  After the initial H&P, I discussed pertinent information from history and physical exam with patient/family.  Discussed differential diagnosis.  Discussed plan for ED evaluation/work-up/treatment.  All questions  answered.  Patient/family is agreeable with plan.    ED Course as of 02/18/22 1910   Thu Feb 17, 2022   1230 EKG     Viewed by ER MD prior to my interpretation     EKG time: 1221  Rhythm/Rate: 44, complete heart block  P waves and RI: variable  QRS, axis: wide  ST and T waves: no acute ST/T wave abnormalities     Interpreted Contemporaneously by me, independently viewed  No prior available for comparison   [EW]   1338 I viewed CXR in PACs. My interpretation is no acute infiltrates.  [EW]   1438 Discussed patient with Dr. Garcia, [facility], He will discuss with EP provider and call me back.  [EW]   1538 Dr. Cowan with [facility] here.  ECHO being performed at bedside.  Dr. Ashraf with EP is here.  He will take patient for pacemaker.  [EW]      ED Course User Index  [EW] Tahmina Valdovinos, CHASE       Diagnosis  Final diagnoses:   AV block, 3rd degree (HCC)        Jamshid Acharya MD  02/18/22 1911

## 2022-02-17 NOTE — ED NOTES
"Nursing report ED to floor  Ayanna Nicole  61 y.o.  female    HPI (triage note):   Chief Complaint   Patient presents with   • Slow Heart Rate       Admitting doctor:   Herber Ashraf MD    Admitting diagnosis:   The primary encounter diagnosis was Complete heart block (HCC). A diagnosis of AV block, 3rd degree (HCC) was also pertinent to this visit.    Code status:   Current Code Status     Date Active Code Status Order ID Comments User Context       Not on file    Advance Care Planning Activity          Allergies:   Patient has no known allergies.    Weight:       02/17/22  1456   Weight: 57.2 kg (126 lb)       Most recent vitals:   Vitals:    02/17/22 1331 02/17/22 1352 02/17/22 1456 02/17/22 1503   BP: 151/64  156/65 156/65   BP Location:    Left arm   Patient Position:    Lying   Pulse: (!) 43 (!) 42 (!) 43 105   Resp:    16   Temp:       SpO2: 100% 98%  100%   Weight:   57.2 kg (126 lb)    Height:   149.9 cm (59\")        Active LDAs/IV Access:   Lines, Drains & Airways     Active LDAs     None                Labs (abnormal labs have a star):   Labs Reviewed   COMPREHENSIVE METABOLIC PANEL - Abnormal; Notable for the following components:       Result Value    Glucose 174 (*)     BUN 27 (*)     Creatinine 1.04 (*)     Alkaline Phosphatase 118 (*)     BUN/Creatinine Ratio 26.0 (*)     All other components within normal limits    Narrative:     GFR Normal >60  Chronic Kidney Disease <60  Kidney Failure <15     CBC WITH AUTO DIFFERENTIAL - Abnormal; Notable for the following components:    MCHC 31.0 (*)     Lymphocyte % 17.9 (*)     All other components within normal limits   COVID-19,[facility] IN-HOUSE CEPHEID/MANISH, NP SWAB IN TRANSPORT MEDIA 8-12 HR TAT - Normal    Narrative:     Fact sheet for providers: https://www.fda.gov/media/129181/download     Fact sheet for patients: https://www.fda.gov/media/636704/download   PROTIME-INR - Normal   TROPONIN (IN-HOUSE) - Normal    Narrative:     Troponin T Reference " Range:  <= 0.03 ng/mL-   Negative for AMI  >0.03 ng/mL-     Abnormal for myocardial necrosis.  Clinicians would have to utilize clinical acumen, EKG, Troponin and serial changes to determine if it is an Acute Myocardial Infarction or myocardial injury due to an underlying chronic condition.       Results may be falsely decreased if patient taking Biotin.     MAGNESIUM - Normal   APTT - Normal   COVID PRE-OP / PRE-PROCEDURE SCREENING ORDER (NO ISOLATION)    Narrative:     The following orders were created for panel order COVID PRE-OP / PRE-PROCEDURE SCREENING ORDER (NO ISOLATION) - Swab, Nasopharynx.  Procedure                               Abnormality         Status                     ---------                               -----------         ------                     COVID-19,[facility] IN-HOU[facility]...[[id number]]  Normal              Final result                 Please view results for these tests on the individual orders.   CBC AND DIFFERENTIAL    Narrative:     The following orders were created for panel order CBC & Differential.  Procedure                               Abnormality         Status                     ---------                               -----------         ------                     CBC Auto Differential[[id number]]        Abnormal            Final result                 Please view results for these tests on the individual orders.       EKG:   ECG 12 Lead   Preliminary Result   HEART RATE= 44  bpm   RR Interval= 1356  ms   MI Interval= 97  ms   P Horizontal Axis= -25  deg   P Front Axis= 57  deg   QRSD Interval= 139  ms   QT Interval= 513  ms   QRS Axis= -65  deg   T Wave Axis= 84  deg   - ABNORMAL ECG -   Predominant 2:1 AV block   Biatrial enlargement   RBBB and LAFB   Left ventricular hypertrophy   ST elevation secondary to IVCD   Electronically Signed By:    Date and Time of Study: 2022-02-17 12:22:41          Meds given in ED:   Medications   vancomycin 750 mg/250 mL 0.9% NS IVPB ([facility]) (has no  administration in time range)       Imaging results:  No radiology results for the last day    Ambulatory status:   - Up with assist    Social issues:   Social History     Socioeconomic History   • Marital status:     Nursing report ED to floor       Ele Barros, RN  02/17/22 [id number]

## 2022-02-18 VITALS
BODY MASS INDEX: 25.4 KG/M2 | WEIGHT: 126 LBS | HEART RATE: 71 BPM | DIASTOLIC BLOOD PRESSURE: 69 MMHG | HEIGHT: 59 IN | SYSTOLIC BLOOD PRESSURE: 117 MMHG | RESPIRATION RATE: 16 BRPM | OXYGEN SATURATION: 100 % | TEMPERATURE: 98.5 F

## 2022-02-18 LAB
ANION GAP SERPL CALCULATED.3IONS-SCNC: 12 MMOL/L (ref 5–15)
BUN SERPL-MCNC: 18 MG/DL (ref 8–23)
BUN/CREAT SERPL: 21.2 (ref 7–25)
CALCIUM SPEC-SCNC: 9.3 MG/DL (ref 8.6–10.5)
CHLORIDE SERPL-SCNC: 103 MMOL/L (ref 98–107)
CO2 SERPL-SCNC: 24 MMOL/L (ref 22–29)
CREAT SERPL-MCNC: 0.85 MG/DL (ref 0.57–1)
GFR SERPL CREATININE-BSD FRML MDRD: 82 ML/MIN/1.73
GLUCOSE SERPL-MCNC: 93 MG/DL (ref 65–99)
POTASSIUM SERPL-SCNC: 3.7 MMOL/L (ref 3.5–5.2)
QT INTERVAL: 508 MS
QT INTERVAL: 611 MS
SODIUM SERPL-SCNC: 139 MMOL/L (ref 136–145)

## 2022-02-18 PROCEDURE — 93005 ELECTROCARDIOGRAM TRACING: CPT | Performed by: NURSE PRACTITIONER

## 2022-02-18 PROCEDURE — 99024 POSTOP FOLLOW-UP VISIT: CPT | Performed by: INTERNAL MEDICINE

## 2022-02-18 PROCEDURE — 93005 ELECTROCARDIOGRAM TRACING: CPT | Performed by: INTERNAL MEDICINE

## 2022-02-18 PROCEDURE — 80048 BASIC METABOLIC PNL TOTAL CA: CPT | Performed by: NURSE PRACTITIONER

## 2022-02-18 PROCEDURE — 93010 ELECTROCARDIOGRAM REPORT: CPT | Performed by: INTERNAL MEDICINE

## 2022-02-18 RX ADMIN — HYDROCODONE BITARTRATE AND ACETAMINOPHEN 1 TABLET: 5; 325 TABLET ORAL at 01:52

## 2022-02-18 RX ADMIN — HYDROCODONE BITARTRATE AND ACETAMINOPHEN 1 TABLET: 5; 325 TABLET ORAL at 08:32

## 2022-02-18 RX ADMIN — SODIUM CHLORIDE, PRESERVATIVE FREE 10 ML: 5 INJECTION INTRAVENOUS at 08:33

## 2022-02-18 RX ADMIN — HYDROCHLOROTHIAZIDE 25 MG: 25 TABLET ORAL at 08:32

## 2022-02-18 RX ADMIN — GLIPIZIDE 2.5 MG: 5 TABLET ORAL at 06:40

## 2022-02-18 RX ADMIN — LISINOPRIL 20 MG: 20 TABLET ORAL at 08:32

## 2022-02-18 NOTE — PLAN OF CARE
Goal Outcome Evaluation:  Plan of Care Reviewed With: patient S/p permanent pacemaker, Rhythm showing ventricular paced. VSS, left upper chest incision well approximated, no signs of infection. Pt maintaining limited use of LUE

## 2022-02-18 NOTE — PLAN OF CARE
Goal Outcome Evaluation:  Plan of Care Reviewed With: patient continuing to progress. Appropriate for discharge

## 2022-02-18 NOTE — PAYOR COMM NOTE
"Bernard Swan (61 y.o. Female)                            ATTENTION;   INITIAL CLINICAL FOR REVIEW, AUTH PENDING [id number]                        REPLY TO UR DEPT LALITO JAMISON LPN  [phone] OR CALL                Date of Birth Social Security Number Address Home Phone MRN    1960  [address] [id number] [phone] [id number]    [facility] Marital Status             None        Admission Date Admission Type Admitting Provider Attending Provider Department, Room/Bed    2/17/22 Emergency Herber Ashraf MD Mandrola, John, MD [address] CVI, 2202/1    Discharge Date Discharge Disposition Discharge Destination           Home or Self Care              Attending Provider: Herber Ashraf MD    Allergies: No Known Allergies    Isolation: None   Infection: None   Code Status: Not on file   Advance Care Planning Activity    Ht: 149.9 cm (59\")   Wt: 57.2 kg (126 lb)    Admission Cmt: None   Principal Problem: Complete heart block (HCC) [I44.2] More...                 Active Insurance as of 2/17/2022     Primary Coverage     Payor Plan Insurance Group Employer/Plan Group    KEY BENEFIT ADMINISTRATORS LBP KEY BENEFIT ADMINISTRATORS [facility] [facility] [id number]     Payor Plan Address Payor Plan Phone Number Payor Plan Fax Number Effective Dates    [address] [phone]  1/1/2022 - None Entered    [facility] [id number]       Subscriber Name Subscriber Birth Date Member ID       BERNARD SWAN 1960 [id number]                 Emergency Contacts      (Rel.) Home Phone Work Phone Mobile Phone    YAMIL ABREU (Daughter) [phone] -- --    NICK FARMER (Daughter) [phone] -- --               History & Physical      Jade Mendez APRN at 02/17/22 1536     Attestation signed by Herber Ashraf MD at 02/18/22 1229    I have reviewed this documentation and agree.                                Electrophysiology History & Physical    Date of " Hospital Visit: 2022 12:16 PM  Encounter Provider: CHASE Parker  Place of Service: [facility] CARDIOLOGY  Patient Name: Ayanna Nicole  :1960  Referral Provider: No ref. provider found      Chief complaint: Low heart rate noted at PCP office today    History of Present Illness     61 yr old patient with no prior cardiac history.  She does have hypertension and diabetes.  She went to see her primary care today for routine visit and refills on her medication, they noted that her heart rate was in the 40s and they did an EKG and sent her to the emergency room.    She was found to be in complete heart block with a right bundle branch block.    Dr. Ashraf and I saw her in the emergency room, she has had no chest pain perhaps some more fatigue and shortness of breath with some walks lately but has not noted that her heart rate was slow.    She has had times where she feels like that her heart rate is fast but she thinks that that is due to anxiety because she lost her  last year and she is still having a really hard time with this.    No past medical history on file.      No past surgical history on file.    No family history on file.    Medications Prior to Admission   Medication Sig Dispense Refill Last Dose   • glipizide (GLUCOTROL XL) 5 MG ER tablet Take 5 mg by mouth Daily.      • hydroCHLOROthiazide (HYDRODIURIL) 25 MG tablet Take 25 mg by mouth Daily.      • quinapril (ACCUPRIL) 20 MG tablet Take 20 mg by mouth Every Night.          Current Meds  No current facility-administered medications on file prior to encounter.     Current Outpatient Medications on File Prior to Encounter   Medication Sig Dispense Refill   • glipizide (GLUCOTROL XL) 5 MG ER tablet Take 5 mg by mouth Daily.     • hydroCHLOROthiazide (HYDRODIURIL) 25 MG tablet Take 25 mg by mouth Daily.     • quinapril (ACCUPRIL) 20 MG tablet Take 20 mg by mouth Every Night.     • [DISCONTINUED]  "glipizide (GLUCOTROL) 5 MG tablet Take 5 mg by mouth 2 (Two) Times a Day.           Social History     Socioeconomic History   • Marital status:          REVIEW OF SYSTEMS: All systems reviewed. Pertinent positives identified in HPI. All other systems are negative.     12 point ROS was performed and is negative except as outlined in HPI            Objective:     Vitals:    02/17/22 1621 02/17/22 1626 02/17/22 1631 02/17/22 1636   BP:       BP Location:       Patient Position:       Pulse:       Resp: 19 18 20 22   Temp:       SpO2:       Weight:       Height:         Body mass index is 25.45 kg/m².  Flowsheet Rows      First Filed Value   Admission Height 149.9 cm (59\") Documented at 02/17/2022 1456   Admission Weight 57.2 kg (126 lb) Documented at 02/17/2022 1456          PHYSICAL EXAM:  Vitals reviewed.   Constitutional:       Appearance: Healthy appearance. Not in distress.   : Pulmonary effort is normal.      Breath sounds: Normal breath sounds.   Cardiovascular:      PMI at left midclavicular line. Bradycardia present. Regular rhythm. Normal S1.   Edema:     Peripheral edema absent.   Abdominal:      General: There is no distension.   Musculoskeletal: Normal range of motion.      Cervical back: Normal range of motion and neck supple. Skin:     General: Skin is warm and dry.   Neurological:      Mental Status: Alert and oriented to person, place and time.                                                                              Lab Review:  Results from last 7 days   Lab Units 02/17/22  1251   SODIUM mmol/L 138   POTASSIUM mmol/L 3.9   CHLORIDE mmol/L 102   CO2 mmol/L 24.9   BUN mg/dL 27*   CREATININE mg/dL 1.04*   GLUCOSE mg/dL 174*   CALCIUM mg/dL 10.0   AST (SGOT) U/L 17   ALT (SGPT) U/L 23     Results from last 7 days   Lab Units 02/17/22  1251   TROPONIN T ng/mL <0.010     Results from last 7 days   Lab Units 02/17/22  1251   WBC 10*3/mm3 5.46   HEMOGLOBIN g/dL 14.3   HEMATOCRIT % 46.1   PLATELETS " 10*3/mm3 205     Results from last 7 days   Lab Units 02/17/22  1251   INR  1.02   APTT seconds 27.8         Results from last 7 days   Lab Units 02/17/22  1251   MAGNESIUM mg/dL 2.2       Imaging:     Echo pending but reviewed images while tech was doing at bedside and EF ok.     EKG:         I personally viewed and interpreted the patient's EKG/Telemetry tracings    Assessment:      Complete heart block (HCC)    AV block, 3rd degree (HCC)       Plan:       Dr. Ashraf and I saw her in the emergency room, she was in complete heart block, she has had some increased fatigue and mild shortness of breath recently.    Was getting an echocardiogram at the time we saw her, results are pending however we did review the images and her EF is normal.    Recommended proceeding with a pacemaker, risk and benefits discussed and she is agreeable.    We will proceed with pacemaker this afternoon.    CHASE Parker  [facility]  02/17/22  16:40 EST      Electronically signed by Herber Ashraf MD at 02/18/22 1229          Emergency Department Notes      Isela Duke RN at 02/17/22 1213        Pt was sent over by pmd for decreased heart rate. Pt states that she went for physical and found her heart rate at 42. Pt denies chest pain, soa, dizziness or weakness    Pt wearing mask on arrival. Staff wearing mask and goggles at time of triage.       Electronically signed by Isela Duke RN at 02/17/22 1214     Tahmina Valdovinos APRN at 02/17/22 1219      Procedure Orders    1. Critical Care [[id number]] ordered by Tahmina Valdovinos APRN               EMERGENCY DEPARTMENT ENCOUNTER    Room Number:  2202/1  Date seen:  2/18/2022  Time seen: 12:19 EST  PCP: Provider, No Known  Historian: patient    HPI:  Chief complaint:sent by PCP for abnormal EKG  A complete HPI/ROS/PMH/PSH/SH/FH are unobtainable due to: n/a  Context:Ayanna Nicole is a 61 y.o. female who presents to the ED with c/o low heart rate  noticed by her PCP when she went there today for medication refills.  She had EKG there showing HR 42.  She has not had this problem.  She denies: chest pain, chest tightness, chest pressure.  No SOA, diaphoresis or n/v. States she has had some complicated grief over the past several months due to her  dying of aneurysm, suddenly.  Also, has multiple other family members who have .      Patient was placed in face mask in first look. Patient was wearing facemask when I entered the room and throughout our encounter. I wore full protective equipment throughout this patient encounter including a N95 face mask, eye shield and gloves. Hand hygiene/washing of hands was performed before donning protective equipment and after removal when leaving the room.    MEDICAL RECORD REVIEW    ALLERGIES  Patient has no known allergies.    PAST MEDICAL HISTORY  Active Ambulatory Problems     Diagnosis Date Noted   • No Active Ambulatory Problems     Resolved Ambulatory Problems     Diagnosis Date Noted   • No Resolved Ambulatory Problems     No Additional Past Medical History       PAST SURGICAL HISTORY  History reviewed. No pertinent surgical history.    FAMILY HISTORY  Family History   Problem Relation Age of Onset   • Diabetes Mother    • Heart disease Father        SOCIAL HISTORY  Social History     Socioeconomic History   • Marital status:    Tobacco Use   • Smoking status: Former Smoker     Quit date: 1990     Years since quittin.0   • Smokeless tobacco: Never Used   Vaping Use   • Vaping Use: Never used   Substance and Sexual Activity   • Alcohol use: Not Currently   • Drug use: Never   • Sexual activity: Defer     Partners: Male       REVIEW OF SYSTEMS  Review of Systems    All systems reviewed and negative except for those discussed in HPI.     PHYSICAL EXAM    ED Triage Vitals [22 1215]   Temp Heart Rate Resp BP SpO2   97.7 °F (36.5 °C) (!) 39 18 (!) 194/66 99 %      Temp src Heart Rate  Source Patient Position BP Location FiO2 (%)   -- -- Standing Right arm --     Physical Exam    I have reviewed the triage vital signs and nursing notes.      GENERAL: not distressed, tearful, anxious  HENT: nares patent  EYES: no scleral icterus  NECK: no ROM limitations  CV: regular rhythm, bradycardic, no murmur, no rubs, no gallups  RESPIRATORY: normal effort, CTAB  ABDOMEN: soft  : deferred  MUSCULOSKELETAL: no deformity, no lower extremity.   NEURO: alert, moves all extremities, follows commands  SKIN: warm, dry    Critical Care  Performed by: Tahmina Valdovinos APRN  Authorized by: Jamshid Acharya MD     Critical care provider statement:     Critical care time (minutes): 30-74.    Critical care was necessary to treat or prevent imminent or life-threatening deterioration of the following conditions:  Cardiac failure    Critical care was time spent personally by me on the following activities:  Blood draw for specimens, development of treatment plan with patient or surrogate, discussions with consultants, discussions with primary provider, evaluation of patient's response to treatment, examination of patient, obtaining history from patient or surrogate, ordering and performing treatments and interventions, ordering and review of laboratory studies, ordering and review of radiographic studies, pulse oximetry, re-evaluation of patient's condition and review of old charts        LAB RESULTS  Recent Results (from the past 24 hour(s))   ECG 12 Lead    Collection Time: 02/17/22 12:22 PM   Result Value Ref Range    QT Interval 513 ms   Comprehensive Metabolic Panel    Collection Time: 02/17/22 12:51 PM    Specimen: Blood   Result Value Ref Range    Glucose 174 (H) 65 - 99 mg/dL    BUN 27 (H) 8 - 23 mg/dL    Creatinine 1.04 (H) 0.57 - 1.00 mg/dL    Sodium 138 136 - 145 mmol/L    Potassium 3.9 3.5 - 5.2 mmol/L    Chloride 102 98 - 107 mmol/L    CO2 24.9 22.0 - 29.0 mmol/L    Calcium 10.0 8.6 - 10.5 mg/dL    Total  Protein 8.1 6.0 - 8.5 g/dL    Albumin 5.00 3.50 - 5.20 g/dL    ALT (SGPT) 23 1 - 33 U/L    AST (SGOT) 17 1 - 32 U/L    Alkaline Phosphatase 118 (H) 39 - 117 U/L    Total Bilirubin 0.7 0.0 - 1.2 mg/dL    eGFR  African Amer 65 >60 mL/min/1.73    Globulin 3.1 gm/dL    A/G Ratio 1.6 g/dL    BUN/Creatinine Ratio 26.0 (H) 7.0 - 25.0    Anion Gap 11.1 5.0 - 15.0 mmol/L   Protime-INR    Collection Time: 02/17/22 12:51 PM    Specimen: Blood   Result Value Ref Range    Protime 13.3 11.7 - 14.2 Seconds    INR 1.02 0.90 - 1.10   Troponin    Collection Time: 02/17/22 12:51 PM    Specimen: Blood   Result Value Ref Range    Troponin T <0.010 0.000 - 0.030 ng/mL   CBC Auto Differential    Collection Time: 02/17/22 12:51 PM    Specimen: Blood   Result Value Ref Range    WBC 5.46 3.40 - 10.80 10*3/mm3    RBC 5.24 3.77 - 5.28 10*6/mm3    Hemoglobin 14.3 12.0 - 15.9 g/dL    Hematocrit 46.1 34.0 - 46.6 %    MCV 88.0 79.0 - 97.0 fL    MCH 27.3 26.6 - 33.0 pg    MCHC 31.0 (L) 31.5 - 35.7 g/dL    RDW 14.3 12.3 - 15.4 %    RDW-SD 46.2 37.0 - 54.0 fl    MPV 10.5 6.0 - 12.0 fL    Platelets 205 140 - 450 10*3/mm3    Neutrophil % 72.8 42.7 - 76.0 %    Lymphocyte % 17.9 (L) 19.6 - 45.3 %    Monocyte % 7.3 5.0 - 12.0 %    Eosinophil % 1.1 0.3 - 6.2 %    Basophil % 0.5 0.0 - 1.5 %    Immature Grans % 0.4 0.0 - 0.5 %    Neutrophils, Absolute 3.97 1.70 - 7.00 10*3/mm3    Lymphocytes, Absolute 0.98 0.70 - 3.10 10*3/mm3    Monocytes, Absolute 0.40 0.10 - 0.90 10*3/mm3    Eosinophils, Absolute 0.06 0.00 - 0.40 10*3/mm3    Basophils, Absolute 0.03 0.00 - 0.20 10*3/mm3    Immature Grans, Absolute 0.02 0.00 - 0.05 10*3/mm3    nRBC 0.0 0.0 - 0.2 /100 WBC   Magnesium    Collection Time: 02/17/22 12:51 PM    Specimen: Blood   Result Value Ref Range    Magnesium 2.2 1.6 - 2.4 mg/dL   aPTT    Collection Time: 02/17/22 12:51 PM    Specimen: Blood   Result Value Ref Range    PTT 27.8 22.7 - 35.4 seconds   COVID-19,[facility] IN-HOUSE CEPHEID/MANISH NP SWAB IN  TRANSPORT MEDIA 8-12 HR TAT - Swab, Nasopharynx    Collection Time: 02/17/22 12:52 PM    Specimen: Nasopharynx; Swab   Result Value Ref Range    COVID19 Not Detected Not Detected - Ref. Range   Adult Transthoracic Echo Complete W/ Cont if Necessary Per Protocol    Collection Time: 02/17/22  3:42 PM   Result Value Ref Range    BSA 1.5 m^2    IVSd 0.88 cm    LVIDd 4.2 cm    LVIDs 3.0 cm    LVPWd 0.95 cm    IVS/LVPW 0.93     FS 28.4 %    EDV(Teich) 78.2 ml    ESV(Teich) 35.0 ml    EF(Teich) 55.2 %    EDV(cubed) 73.6 ml    ESV(cubed) 27.0 ml    EF(cubed) 63.3 %    LV mass(C)d 121.4 grams    LV mass(C)dI 82.1 grams/m^2    SV(Teich) 43.2 ml    SI(Teich) 29.2 ml/m^2    SV(cubed) 46.6 ml    SI(cubed) 31.5 ml/m^2    Ao root diam 2.2 cm    Ao root area 3.9 cm^2    ACS 2.2 cm    LA dimension 2.7 cm    asc Aorta Diam 2.2 cm    LA/Ao 1.2     LVOT diam 2.0 cm    LVOT area 3.1 cm^2    LVOT area(traced) 3.1 cm^2    RVOT diam 2.5 cm    RVOT area 4.8 cm^2    LVLd ap4 6.0 cm    EDV(MOD-sp4) 52.0 ml    LVLs ap4 4.6 cm    ESV(MOD-sp4) 18.0 ml    EF(MOD-sp4) 65.4 %    LVLd ap2 5.8 cm    EDV(MOD-sp2) 53.0 ml    LVLs ap2 4.6 cm    ESV(MOD-sp2) 16.0 ml    EF(MOD-sp2) 69.8 %    SV(MOD-sp4) 34.0 ml    SI(MOD-sp4) 23.0 ml/m^2    SV(MOD-sp2) 37.0 ml    SI(MOD-sp2) 25.0 ml/m^2    Ao root area (BSA corrected) 1.5     LV Spring Vol (BSA corrected) 35.2 ml/m^2    LV Sys Vol (BSA corrected) 12.2 ml/m^2    TAPSE (>1.6) 2.0 cm    EF(MOD-bp) 67.7 %    MV E max mckinley 87.8 cm/sec    MV A max mckinley 92.1 cm/sec    MV E/A 0.95     MV V2 max 107.2 cm/sec    MV max PG 4.6 mmHg    MV V2 mean 55.9 cm/sec    MV mean PG 1.6 mmHg    MV V2 VTI 50.3 cm    MVA(VTI) 1.3 cm^2    MV P1/2t max mckinley 112.7 cm/sec    MV P1/2t 56.8 msec    MVA(P1/2t) 3.9 cm^2    MV dec slope 581.0 cm/sec^2    MV dec time 108 sec    Ao pk mckinley 149.3 cm/sec    Ao max PG 8.9 mmHg    Ao max PG (full) 6.0 mmHg    Ao V2 mean 96.9 cm/sec    Ao mean PG 4.2 mmHg    Ao mean PG (full) 2.8 mmHg    Ao V2 VTI  36.1 cm    TRAVIS(I,A) 1.8 cm^2    TRAVIS(I,D) 1.8 cm^2    TRAVIS(V,A) 1.8 cm^2    TRAVIS(V,D) 1.8 cm^2    LV V1 max PG 2.9 mmHg    LV V1 mean PG 1.4 mmHg    LV V1 max 85.3 cm/sec    LV V1 mean 55.6 cm/sec    LV V1 VTI 21.4 cm    SV(Ao) 140.7 ml    SI(Ao) 95.2 ml/m^2    SV(LVOT) 65.8 ml    SV(RVOT) 81.1 ml    SI(LVOT) 44.5 ml/m^2    PA V2 max 102.0 cm/sec    PA max PG 4.2 mmHg    PA max PG (full) 2.0 mmHg     CV ECHO IAIN - PVA(V,A) 3.4 cm^2     CV ECHO IAIN - PVA(V,D) 3.4 cm^2    PA acc time 0.15 sec    RV V1 max PG 2.1 mmHg    RV V1 mean PG 0.9 mmHg    RV V1 max 72.8 cm/sec    RV V1 mean 43.1 cm/sec    RV V1 VTI 17.0 cm    TR max bg 267.4 cm/sec    RVSP(TR) 31.6 mmHg    RAP systole 3.0 mmHg    PA pr(Accel) 11.3 mmHg    Qp/Qs 1.2     MVA P1/2T LCG 2.0 cm^2    RV Base 2.6 cm    RV Length 5.3 cm    RV Mid 1.8 cm    Lat Peak E' Bg 14.6 cm/sec    Med Peak E' Bg 12.7 cm/sec    RV S' 11.7 cm/sec     CV ECHO IAIN - BZI_BMI 27.3 kilograms/m^2     CV ECHO IAIN - BSA(HAYCOCK) 1.5 m^2     CV ECHO IAIN - BZI_METRIC_WEIGHT 57.2 kg     CV ECHO IAIN - BZI_METRIC_HEIGHT 144.8 cm    Avg E/e' ratio 6.43     Sinus 2.2 cm    STJ 2.2 cm    Ascending aorta 2.2 cm    LA Volume Index 22.7 mL/m2    Target HR (85%) 133 bpm    Max. Pred. HR (100%) 157 bpm   ECG 12 Lead    Collection Time: 02/17/22  6:20 PM   Result Value Ref Range    QT Interval 436 ms   Basic Metabolic Panel    Collection Time: 02/18/22  5:07 AM    Specimen: Blood   Result Value Ref Range    Glucose 93 65 - 99 mg/dL    BUN 18 8 - 23 mg/dL    Creatinine 0.85 0.57 - 1.00 mg/dL    Sodium 139 136 - 145 mmol/L    Potassium 3.7 3.5 - 5.2 mmol/L    Chloride 103 98 - 107 mmol/L    CO2 24.0 22.0 - 29.0 mmol/L    Calcium 9.3 8.6 - 10.5 mg/dL    eGFR  African Amer 82 >60 mL/min/1.73    BUN/Creatinine Ratio 21.2 7.0 - 25.0    Anion Gap 12.0 5.0 - 15.0 mmol/L   ECG 12 Lead    Collection Time: 02/18/22  5:23 AM   Result Value Ref Range    QT Interval 508 ms         RADIOLOGY  RESULTS  Adult Transthoracic Echo Complete W/ Cont if Necessary Per Protocol    Result Date: 2/17/2022  · Calculated left ventricular EF = 67.7% Estimated left ventricular EF was in agreement with the calculated left ventricular EF. Left ventricular systolic function is normal. · Left ventricular diastolic function was normal.      XR Chest 1 View    Result Date: 2/17/2022  ONE VIEW PORTABLE CHEST  HISTORY: Shortness of breath.  FINDINGS: The lungs are well-expanded and clear and the heart size is normal. There is no acute disease.  This report was finalized on 2/17/2022 1:31 PM by Dr. Spenser Kern M.D.      EP/CRM Study    Result Date: 2/17/2022  Implantation of a dual-chamber pacemaker Pre-op diagnosis: Complete heart block Postop diagnosis: Same The staff prepped and draped the patient in usual sterile fashion.  I infiltrated with 1% lidocaine with epinephrine.  I then gained access to the axillary vein using ultrasound guidance and micropuncture x2. I then made a 5 cm incision and brought the leads through the pocket.  I then made a pocket in the prepectoralis space. I then used a 9 Malaysian sheath to introduce the Medtronic  sheath.  I used a 3830-lead placed in the septal position and actively fixated in the septum through to the left bundle branch area.  I confirmed this with a right bundle branch morphology on the paced QRS.  Pacing and sensing thresholds were good.  I then cut away the  sheath and peeled away the 9 Malaysian sheath and tied the lead down using 2-0 silk ligature and its respective anchoring sleeve. I then used a 7 Malaysian sheath to place the atrial lead.  This was a Medtronic 5076 active-fixation lead. I placed just in the anterior right atrium and actively fixated the lead and observed good injury current and good pacing and sensing thresholds and then tied the lead down using 2-0 silk ligature in its respective anchoring sleeve. I then connected the 2 leads to the pulse generator.   I placed it into the pocket.  I tied it down with 2-0 silk ligature and its respective anchoring sleeve. I then irrigated with vancomycin containing saline and closed with 2-0 Vicryl 2-0 Vicryl and 4-0 Vicryl. Complications: None         PROGRESS, DATA ANALYSIS, CONSULTS AND MEDICAL DECISION MAKING  All labs have been independently reviewed by me.  All radiology studies have been reviewed by me and discussed with radiologist dictating the report.  EKG's independently viewed and interpreted by me unless stated otherwise. Discussion below represents my analysis of pertinent findings related to patient's condition, differential diagnosis, treatment plan and final disposition.     ED Course as of 02/18/22 0834   Thu Feb 17, 2022   1230 EKG     Viewed by ER MD prior to my interpretation     EKG time: 1221  Rhythm/Rate: 44, complete heart block  P waves and MN: variable  QRS, axis: wide  ST and T waves: no acute ST/T wave abnormalities     Interpreted Contemporaneously by me, independently viewed  No prior available for comparison   [EW]   1338 I viewed CXR in PACs. My interpretation is no acute infiltrates.  [EW]   1438 Discussed patient with Dr. Garcia, ZANDRA, He will discuss with EP provider and call me back.  [EW]   1538 Dr. Cowan with [facility] here.  ECHO being performed at bedside.  Dr. Ashraf with EP is here.  He will take patient for pacemaker.  [EW]      ED Course User Index  [EW] Tahmina Valdovinos, APRN     DDX: complete heart block, CHF, STEMI, electrolyte abnormality, Takosubo cardiomyopathy    MDM:  Pt was seen by Dr. Go with LCG and Dr. Ashraf and was taken to cath lab for pacemaker insertion.  Echo was performed in ED as emergent basis.  She had no symptoms other than low heart rate. Lab workup unremarkable     Reviewed pt's history and workup with Dr. Acharya.  After a bedside evaluation, Dr. Acharya agrees with the plan of care.       Based on the patient's lab findings and presenting  "symptoms, the doctor and I feel it is appropriate to admit the patient for further management, evaluation, and treatment.  I have discussed this with the admitting team.  I have also discussed this with the patient/family.  They are in agreement with admission.          Disposition vitals:  /98 (BP Location: Right arm, Patient Position: Sitting)   Pulse 103   Temp 97.9 °F (36.6 °C) (Oral)   Resp 16   Ht 149.9 cm (59\")   Wt 57.2 kg (126 lb)   SpO2 100%   BMI 25.45 kg/m²       DIAGNOSIS  Final diagnoses:   AV block, 3rd degree (HCC)       Admission     Tahmina Valdovinos APRN  02/18/22 0838      Electronically signed by Tahmina Valdovinos APRN at 02/18/22 0838     Ele Barros, RN at 02/17/22 1546          Nursing report ED to floor  Ayanna Nicole  61 y.o.  female    HPI (triage note):   Chief Complaint   Patient presents with   • Slow Heart Rate       Admitting doctor:   Herber Ashraf MD    Admitting diagnosis:   The primary encounter diagnosis was Complete heart block (HCC). A diagnosis of AV block, 3rd degree (HCC) was also pertinent to this visit.    Code status:   Current Code Status     Date Active Code Status Order ID Comments User Context       Not on file    Advance Care Planning Activity          Allergies:   Patient has no known allergies.    Weight:       02/17/22  1456   Weight: 57.2 kg (126 lb)       Most recent vitals:   Vitals:    02/17/22 1331 02/17/22 1352 02/17/22 1456 02/17/22 1503   BP: 151/64  156/65 156/65   BP Location:    Left arm   Patient Position:    Lying   Pulse: (!) 43 (!) 42 (!) 43 105   Resp:    16   Temp:       SpO2: 100% 98%  100%   Weight:   57.2 kg (126 lb)    Height:   149.9 cm (59\")        Active LDAs/IV Access:   Lines, Drains & Airways     Active LDAs     None                Labs (abnormal labs have a star):   Labs Reviewed   COMPREHENSIVE METABOLIC PANEL - Abnormal; Notable for the following components:       Result Value    Glucose 174 (*)     BUN 27 (*)     " Creatinine 1.04 (*)     Alkaline Phosphatase 118 (*)     BUN/Creatinine Ratio 26.0 (*)     All other components within normal limits    Narrative:     GFR Normal >60  Chronic Kidney Disease <60  Kidney Failure <15     CBC WITH AUTO DIFFERENTIAL - Abnormal; Notable for the following components:    MCHC 31.0 (*)     Lymphocyte % 17.9 (*)     All other components within normal limits   COVID-19,[facility] IN-HOUSE CEPHEID/MANISH, NP SWAB IN TRANSPORT MEDIA 8-12 HR TAT - Normal    Narrative:     Fact sheet for providers: https://www.fda.gov/media/880204/download     Fact sheet for patients: https://www.fda.gov/media/287678/download   PROTIME-INR - Normal   TROPONIN (IN-HOUSE) - Normal    Narrative:     Troponin T Reference Range:  <= 0.03 ng/mL-   Negative for AMI  >0.03 ng/mL-     Abnormal for myocardial necrosis.  Clinicians would have to utilize clinical acumen, EKG, Troponin and serial changes to determine if it is an Acute Myocardial Infarction or myocardial injury due to an underlying chronic condition.       Results may be falsely decreased if patient taking Biotin.     MAGNESIUM - Normal   APTT - Normal   COVID PRE-OP / PRE-PROCEDURE SCREENING ORDER (NO ISOLATION)    Narrative:     The following orders were created for panel order COVID PRE-OP / PRE-PROCEDURE SCREENING ORDER (NO ISOLATION) - Swab, Nasopharynx.  Procedure                               Abnormality         Status                     ---------                               -----------         ------                     COVID-19, [facility] IN-HOUSE...[[id number]]  Normal              Final result                 Please view results for these tests on the individual orders.   CBC AND DIFFERENTIAL    Narrative:     The following orders were created for panel order CBC & Differential.  Procedure                               Abnormality         Status                     ---------                               -----------         ------                     CBC  Auto Differential[[id number]]        Abnormal            Final result                 Please view results for these tests on the individual orders.       EKG:   ECG 12 Lead   Preliminary Result   HEART RATE= 44  bpm   RR Interval= 1356  ms   NJ Interval= 97  ms   P Horizontal Axis= -25  deg   P Front Axis= 57  deg   QRSD Interval= 139  ms   QT Interval= 513  ms   QRS Axis= -65  deg   T Wave Axis= 84  deg   - ABNORMAL ECG -   Predominant 2:1 AV block   Biatrial enlargement   RBBB and LAFB   Left ventricular hypertrophy   ST elevation secondary to IVCD   Electronically Signed By:    Date and Time of Study: 2022-02-17 12:22:41          Meds given in ED:   Medications   vancomycin 750 mg/250 mL 0.9% NS IVPB ([facility]) (has no administration in time range)       Imaging results:  No radiology results for the last day    Ambulatory status:   - Up with assist    Social issues:   Social History     Socioeconomic History   • Marital status:     Nursing report ED to floor       Ele Barros RN  02/17/22 1543      Electronically signed by lEe Barros RN at 02/17/22 1549       Vital Signs     Date/Time Temp Temp src Pulse Resp BP Patient Position SpO2    02/18/22 1146 98.5 (36.9) Oral 71 16 117/69 Lying 100    02/18/22 0721 97.9 (36.6) Oral 103 16 139/98 Sitting --    02/17/22 2320 97.8 (36.6) Oral 69 16 138/80 Lying --    02/17/22 1912 97.9 (36.6) Oral 79 18 145/87 Lying --    02/17/22 17:44:03 -- -- -- 19 -- -- --    02/17/22 17:39:02 -- -- -- 19 -- -- --    02/17/22 17:34:32 -- -- -- 19 -- -- --    02/17/22 17:29:54 -- -- -- 21 -- -- --    02/17/22 17:24:29 -- -- -- 16 -- -- --    02/17/22 17:19:47 -- -- -- 19 -- -- --    02/17/22 17:15:03 -- -- -- 19 -- -- --    02/17/22 17:10:52 -- -- -- 19 -- -- --    02/17/22 17:06:19 -- -- -- 18 -- -- --    02/17/22 17:01:21 -- -- -- 18 -- -- --    02/17/22 16:56:13 -- -- -- 18 -- -- --    02/17/22 16:51:20 -- -- -- 19 -- -- --    02/17/22 16:46:44 -- -- -- 20 -- -- --     02/17/22 16:41:38 -- -- -- 18 -- -- --    02/17/22 16:36:42 -- -- -- 22 -- -- --    02/17/22 16:31:29 -- -- -- 20 -- -- --    02/17/22 16:26:51 -- -- -- 18 -- -- --    02/17/22 16:21:31 -- -- -- 19 -- -- --    02/17/22 16:16:14 -- -- -- 18 -- -- --    02/17/22 16:11:33 -- -- -- 17 -- -- 100    02/17/22 1503 -- -- 105 16 156/65 Lying 100    02/17/22 1456 -- -- 43 -- 156/65 -- --    02/17/22 1352 -- -- 42 -- -- -- 98    02/17/22 1331 -- -- 43 -- 151/64 -- 100    02/17/22 1301 -- -- 42 -- 152/64 -- 99    02/17/22 1231 -- -- 43 -- 169/67 -- 100    02/17/22 1215 97.7 (36.5) -- 39 18 194/66 Standing 99          Oxygen Therapy (last 3 days)     Date/Time SpO2 Device (Oxygen Therapy) Flow (L/min) Oxygen Concentration (%) ETCO2 (mmHg)    02/18/22 1146 100 room air -- -- --    02/18/22 0800 -- room air -- -- --    02/18/22 0152 -- room air -- -- --    02/17/22 2320 -- room air -- -- --    02/17/22 2000 -- room air -- -- --    02/17/22 1912 -- room air -- -- --    02/17/22 1815 -- -- 0 -- --    02/17/22 17:44:03 -- -- -- -- 35    02/17/22 17:39:02 -- -- -- -- 35    02/17/22 17:34:32 -- -- -- -- 35    02/17/22 17:29:54 -- -- -- -- 35 02/17/22 17:19:47 -- -- -- -- 35 02/17/22 17:15:03 -- -- -- -- 35 02/17/22 17:10:52 -- -- -- -- 33    02/17/22 17:06:19 -- -- -- -- 35 02/17/22 17:01:21 -- -- -- -- 35 02/17/22 16:56:13 -- -- -- -- 35 02/17/22 16:51:20 -- -- -- -- 35 02/17/22 16:46:44 -- -- -- -- 35    02/17/22 16:41:38 -- -- -- -- 38    02/17/22 16:36:42 -- -- -- -- 36 02/17/22 16:31:29 -- -- -- -- 36    02/17/22 16:26:51 -- -- -- -- 36    02/17/22 16:21:31 -- -- -- -- 39    02/17/22 16:16:14 -- -- -- -- 39    02/17/22 16:12:53 -- -- 3 -- --    02/17/22 16:11:33 100 -- -- -- 35 02/17/22 1503 100 -- -- -- --    02/17/22 1352 98 -- -- -- --    02/17/22 1331 100 -- -- -- --    02/17/22 1301 99 -- -- -- --    02/17/22 1231 100 -- -- -- --    02/17/22 1215 99 -- -- -- --        Lines, Drains & Airways      Active LDAs     Name Placement date Placement time Site Days    Peripheral IV 02/17/22 1614 Right Antecubital 02/17/22  1614  Antecubital  less than 1          Inactive LDAs     None                  Facility-Administered Medications as of 2/18/2022   Medication Dose Route Frequency Provider Last Rate Last Admin   • acetaminophen (TYLENOL) tablet 650 mg  650 mg Oral Q4H PRN Jade Mendez APRN   650 mg at 02/17/22 2023    Or   • acetaminophen (TYLENOL) suppository 650 mg  650 mg Rectal Q4H PRN Jade Mendez APRN       • glipizide (GLUCOTROL) tablet 2.5 mg  2.5 mg Oral BID AC Jade Mendez APRN   2.5 mg at 02/18/22 0640   • hydroCHLOROthiazide (HYDRODIURIL) tablet 25 mg  25 mg Oral Daily Jade Mendez APRN   25 mg at 02/18/22 0832   • HYDROcodone-acetaminophen (NORCO) 5-325 MG per tablet 1 tablet  1 tablet Oral Q4H PRN Jade Mendze APRN   1 tablet at 02/18/22 0832   • HYDROmorphone (DILAUDID) injection 0.5 mg  0.5 mg Intravenous Q2H PRN Jade Mendez APRN        And   • naloxone (NARCAN) injection 0.4 mg  0.4 mg Intravenous Q5 Min PRN Jade Mendez APRN       • lisinopril (PRINIVIL,ZESTRIL) tablet 20 mg  20 mg Oral Daily Jade Mendez APRN   20 mg at 02/18/22 0832   • nitroglycerin (NITROSTAT) SL tablet 0.4 mg  0.4 mg Sublingual Q5 Min PRN Herber Ashraf MD       • sodium chloride 0.9 % flush 10 mL  10 mL Intravenous Q12H Jade Mendez APRN   10 mL at 02/18/22 0833   • sodium chloride 0.9 % flush 10 mL  10 mL Intravenous PRN Jade Mendez APRN       • [COMPLETED] vancomycin (VANCOCIN) in iso-osmotic dextrose IVPB 1 g (premix) 200 mL    Continuous PRN Herber Ashraf  mL/hr at 02/17/22 1616 1 g at 02/17/22 1616   • vancomycin 750 mg/250 mL 0.9% NS IVPB ([facility])  15 mg/kg Intravenous On Call Jaed Mendez APRN         Orders (last 24 hrs)      Start     Ordered    02/18/22 1234  Discharge patient  Once         02/18/22 1234    02/18/22 1232  ECG 12 Lead  Once         02/18/22 1231    02/18/22  1219  Inpatient Admission  Once         02/18/22 1218    02/18/22 0837  Critical Care  Once        Comments: This order was created via procedure documentation    02/18/22 0836    02/18/22 0600  vancomycin 750 mg/250 mL 0.9% NS IVPB ([facility])  On Call         02/17/22 1529    02/18/22 0600  Basic Metabolic Panel  Morning Draw         02/17/22 1645    02/18/22 0600  ECG 12 Lead  Once         02/17/22 1812    02/17/22 2100  sodium chloride 0.9 % flush 10 mL  Every 12 Hours Scheduled         02/17/22 1812    02/17/22 1841  Inpatient Consult to Advance Care Planning  Once        Provider:  (Not yet assigned)    02/17/22 1841    02/17/22 1814  Telemetry - Maintain IV Access  Continuous         02/17/22 1813    02/17/22 1814  Continuous Cardiac Monitoring  Continuous         02/17/22 1813    02/17/22 1814  May Be Off Telemetry for Tests  Continuous         02/17/22 1813    02/17/22 1814  ACLS Protocol For Life Threatening Dysrhythmias (Unless Code Status Indicates Otherwise)  Continuous         02/17/22 1813    02/17/22 1814  Notify Provider if ACLS Protocol Activated  Until Discontinued         02/17/22 1813    02/17/22 1813  nitroglycerin (NITROSTAT) SL tablet 0.4 mg  Every 5 Minutes PRN         02/17/22 1813    02/17/22 1813  Vital Signs  Per Hospital Policy         02/17/22 1812    02/17/22 1813  Notify MD of implant site hematoma or bleeding, new onset SOA or cyanosis, unstable VS, signs of pacemaker or ICD dysfunction, new onset dysrhytmia or temp greater than 37.8 C.  Until Discontinued         02/17/22 1812    02/17/22 1813  Advance diet as tolerated  Until Discontinued         02/17/22 1812    02/17/22 1813  Insert Peripheral IV  Once         02/17/22 1812    02/17/22 1813  Saline Lock & Maintain IV Access  Continuous,   Status:  Canceled         02/17/22 1812    02/17/22 1813  Cardiac Monitoring  Continuous,   Status:  Canceled         02/17/22 1812    02/17/22 1813  Bed Rest  Until Discontinued        Comments:  "Then up with assistance as tolerated    02/17/22 1812 02/17/22 1813  Diet Regular; Consistent Carbohydrate  Diet Effective Now         02/17/22 1812 02/17/22 1813  ECG 12 Lead  STAT         02/17/22 1812 02/17/22 1812  HYDROmorphone (DILAUDID) injection 0.5 mg  Every 2 Hours PRN        \"And\" Linked Group Details    02/17/22 1812 02/17/22 1812  naloxone (NARCAN) injection 0.4 mg  Every 5 Minutes PRN        \"And\" Linked Group Details    02/17/22 1812 02/17/22 1812  acetaminophen (TYLENOL) tablet 650 mg  Every 4 Hours PRN        \"Or\" Linked Group Details    02/17/22 1812 02/17/22 1812  acetaminophen (TYLENOL) suppository 650 mg  Every 4 Hours PRN        \"Or\" Linked Group Details    02/17/22 1812 02/17/22 1812  sodium chloride 0.9 % flush 10 mL  As Needed         02/17/22 1812 02/17/22 1812  HYDROcodone-acetaminophen (NORCO) 5-325 MG per tablet 1 tablet  Every 4 Hours PRN         02/17/22 1812    02/17/22 1730  glipizide (GLUCOTROL) tablet 2.5 mg  2 Times Daily Before Meals         02/17/22 1645    02/17/22 1647  hydroCHLOROthiazide (HYDRODIURIL) tablet 25 mg  Daily         02/17/22 1645    02/17/22 1647  lisinopril (PRINIVIL,ZESTRIL) tablet 20 mg  Daily         02/17/22 1645    02/17/22 1631  lidocaine 1% - EPINEPHrine 1:242722 (XYLOCAINE W/EPI) 1 %-1:523788 injection  As Needed,   Status:  Discontinued         02/17/22 1631    02/17/22 1628  methohexital (BREVITAL SODIUM) injection  As Needed,   Status:  Discontinued         02/17/22 1628    02/17/22 1620  midazolam (VERSED) injection  As Needed,   Status:  Discontinued         02/17/22 1620    02/17/22 1620  fentaNYL citrate (PF) (SUBLIMAZE) injection  As Needed,   Status:  Discontinued         02/17/22 1620    02/17/22 1616  vancomycin (VANCOCIN) in iso-osmotic dextrose IVPB 1 g (premix) 200 mL  Continuous PRN         02/17/22 1617    02/17/22 1541  Initiate Observation Status  Once         02/17/22 1540    02/17/22 1541  Cardiac Monitoring  " Once,   Status:  Canceled         02/17/22 1540    02/17/22 1533  EP/CRM Study  Once         02/17/22 1532    02/17/22 1529  NPO Diet  Diet Effective Now,   Status:  Canceled         02/17/22 1529    02/17/22 1529  Obtain Informed Consent  Once         02/17/22 1529    02/17/22 1528  Case Request EP Lab: Pacemaker DC new--MDT  Once         02/17/22 1529    02/17/22 1442  Adult Transthoracic Echo Complete W/ Cont if Necessary Per Protocol  Once         02/17/22 1442    Unscheduled  Telemetry - Pulse Oximetry  Continuous PRN      Comments: If Patient Develops Unresponsiveness, Acute Dyspnea, Cyanosis or Suspected Hypoxemia Start Continuous Pulse Ox Monitoring, Apply Oxygen & Notify Provider    02/17/22 1813    Unscheduled  Oxygen Therapy- Nasal Cannula; Titrate for SPO2: 90% - 95%  Continuous PRN      Comments: If Patient Develops Unresponsiveness, Acute Dyspnea, Cyanosis or Suspected Hypoxemia Start Continuous Pulse Ox Monitoring, Apply Oxygen & Notify Provider    02/17/22 1813    Unscheduled  ECG 12 Lead  As Needed      Comments: Nurse to Release if Patient Expericences Acute Chest Pain or Dysrhythmias    02/17/22 1813    Unscheduled  Potassium  As Needed      Comments: For Ventricular Arrhythmias      02/17/22 1813    Unscheduled  Magnesium  As Needed      Comments: For Ventricular Arrhythmias      02/17/22 1813    Unscheduled  Troponin  As Needed      Comments: For Chest Pain      02/17/22 1813    Unscheduled  Blood Gas, Arterial -  As Needed      Comments: Per O2 PolicyNotify Physician      02/17/22 1813    --  glipizide (GLUCOTROL) 5 MG tablet  2 Times Daily,   Status:  Discontinued         02/17/22 1554    --  hydroCHLOROthiazide (HYDRODIURIL) 25 MG tablet  Daily         02/17/22 1554    --  quinapril (ACCUPRIL) 20 MG tablet  Nightly         02/17/22 1603    --  glipizide (GLUCOTROL XL) 5 MG ER tablet  Daily         02/17/22 1604                     Consult Notes      Cisco Gaviria at 02/18/22 1400      Consult  Orders    1. Inpatient Consult to Advance Care Planning [[id number]] ordered by Herber Ashraf MD at 22 1841          Summary:Advance Care/Spiritual Care             Spoke w/ pt about recent loss of  and ongoing grief process. Had prayer. Pt received AD paperwork. Is being discharged, but may fill out AD and seek notary when she returns for follow-up visit.    Electronically signed by Cisco Gaviria at 22 1402           [facility] CATH LAB  [address] [address]  [phone]              [facility] CATH LAB  [address] [id number] [phone]         Ayanna Nicole  EP/CRM Study  Order# [id number]  Reading physician: Herber Ashraf MD Ordering physician: Jade Mendez APRN Study date: 22      Procedures    Pacemaker DC new--MDT        Patient Information    Patient Name   Ayanna Nicole MRN   [id number] Legal Sex   Female  (Age)   1960 (61 y.o.)     Admission Information    Admission Date/Time Discharge Date/Time Room/Bed   22  12:16 PM       Patient Hx Of Height, Weight, and Vitals    Height Weight BSA (Calculated - sq m) BMI (kg/m2) Pulse BP             Physicians    Panel Physicians Referring Physician Case Authorizing Physician   Herber Ashraf MD (Primary)  Jade Mendez APRN     Indications    Complete heart block (HCC) [I44.2 (ICD-10-CM)]     Pre Procedure Diagnosis    CHB         Conclusion    Implantation of a dual-chamber pacemaker     Pre-op diagnosis: Complete heart block  Postop diagnosis: Same     The staff prepped and draped the patient in usual sterile fashion.  I infiltrated with 1% lidocaine with epinephrine.  I then gained access to the axillary vein using ultrasound guidance and micropuncture x2.     I then made a 5 cm incision and brought the leads through the pocket.  I then made a pocket in the prepectoralis space.     I then used a 9 [place] sheath to introduce the Medtronic   sheath.  I used a 3830-lead placed in the septal position and actively fixated in the septum through to the left bundle branch area.  I confirmed this with a right bundle branch morphology on the paced QRS.  Pacing and sensing thresholds were good.  I then cut away the  sheath and peeled away the 9 Portuguese sheath and tied the lead down using 2-0 silk ligature and its respective anchoring sleeve.     I then used a 7 Portuguese sheath to place the atrial lead.  This was a Medtronic 5076 active-fixation lead. I placed just in the anterior right atrium and actively fixated the lead and observed good injury current and good pacing and sensing thresholds and then tied the lead down using 2-0 silk ligature in its respective anchoring sleeve.     I then connected the 2 leads to the pulse generator.  I placed it into the pocket.  I tied it down with 2-0 silk ligature and its respective anchoring sleeve.     I then irrigated with vancomycin containing saline and closed with 2-0 Vicryl 2-0 Vicryl and 4-0 Vicryl.     Complications: None            Radiation     Event Details User   5:28 PM Radiation Tracking Panel 1: Herber Ashraf MD   Cumulative Air Kerma (mGy) = 107.000; Fluoro Time (min) = 10.2; DAP (mGy-cm2) = 46813.000 GB     Medications  (Filter):  CV CONTRAST GROUPER Medications Shown  None     Implants       Pacemaker     Gen Pm Guillermina Xt Harsh Gama Mri - S [id number] - [id number] - Implanted    Inventory item: GEN PM GUILLERMINA XT SURECLARISSE GAMA MRI Model/Cat number: [id number]   Serial number:  [id number] : [employer]TRONIC   Implant Date: 2/17/2022 Initial Device: Yes   Temp Pacemaker: No       As of 2/17/2022    Status: Implanted        Lead     Ld Pm Capsurefix [id number] 52cm - S [id number] - [id number] - Implanted    Inventory item: LD PM CAPSUREFIX [id number] 52CM Model/Cat number: [id number]   Serial number:  [id number] : [employer]TRON[employer]     As of 2/17/2022    Status: Implanted          Ld Pace  Selectsecure Bipol Str 69cm - [id number] - [id number] - Implanted    Inventory item: BRADEN LLOYD SELECTSECURE BIPOL STR 69CM Model/Cat number: [id number]   Serial number: [id number] : [employer]TRONIC     As of 2/17/2022    Status: Implanted          PACS Images     Show images for EP/CRM Study    Signed    Electronically signed by Herber Ashraf MD on 2/17/22 at 1745 EST     Link to Procedure Log    Procedure Log     Printable Result Report    Result Report      Encounter    View Encounter               Results Routing Tracking    View Results Routing Information     Order-Level Documents:    Scan on 2/17/2022 1545 by New Onbase, [place]: EP STUDY - SCAN           Order Report    Order Details

## 2022-02-18 NOTE — DISCHARGE SUMMARY
DISCHARGE NOTE    Patient Name: Ayanna Nicole  Age/Sex: 61 y.o. female  : 1960  MRN: [id number]    Date of Discharge:  2022   Date of Admit: 2022  Encounter Provider: Herber Ashraf MD  Place of Service: [facility] CARDIOLOGY  Patient Care Team:  Provider, No Known as PCP - General    Subjective:     Discharge Diagnosis:    Complete heart block (HCC)    AV block, 3rd degree (HCC)      Hospital Course:   She came in for complete heart block that was symptomatic with dyspnea.  I took her to the lab and she had a pacemaker with a left bundle branch area lead.  She did well and will just be discharged  Vital Signs  Temp:  [97.8 °F (36.6 °C)-98.5 °F (36.9 °C)] 98.5 °F (36.9 °C)  Heart Rate:  [] 71  Resp:  [16-22] 16  BP: (117-156)/(64-98) 117/69    Intake/Output Summary (Last 24 hours) at 2022 1231  Last data filed at 2022 1146  Gross per 24 hour   Intake 1040 ml   Output --   Net 1040 ml       Physical Exam:  Eyes:      General:         Right eye: No discharge.         Left eye: No discharge.   HENT:      Head: Normocephalic and atraumatic.   Neck:      Thyroid: No thyromegaly.      Vascular: No JVD.   Pulmonary:      Effort: Pulmonary effort is normal.      Breath sounds: Normal breath sounds. No rales.   Cardiovascular:      Normal rate. Regular rhythm.      No gallop.   Edema:     Peripheral edema absent.   Abdominal:      General: Bowel sounds are normal.      Palpations: Abdomen is soft.      Tenderness: There is no abdominal tenderness.   Musculoskeletal: Normal range of motion.         General: No deformity. Skin:     General: Skin is warm and dry.      Findings: No erythema.   Neurological:      Mental Status: Alert and oriented to person, place, and time.      Motor: Normal muscle tone.   Psychiatric:         Behavior: Behavior normal.          Thought Content: Thought content normal.     Pacer site was okay.    Labs:   Results from last 7 days   Lab Units 02/18/22  0507 02/17/22  1251   SODIUM mmol/L 139 138   POTASSIUM mmol/L 3.7 3.9   CHLORIDE mmol/L 103 102   CO2 mmol/L 24.0 24.9   BUN mg/dL 18 27*   CREATININE mg/dL 0.85 1.04*   GLUCOSE mg/dL 93 174*   CALCIUM mg/dL 9.3 10.0   AST (SGOT) U/L  --  17   ALT (SGPT) U/L  --  23     Results from last 7 days   Lab Units 02/17/22  1251   TROPONIN T ng/mL <0.010     Results from last 7 days   Lab Units 02/17/22  1251   WBC 10*3/mm3 5.46   HEMOGLOBIN g/dL 14.3   HEMATOCRIT % 46.1   PLATELETS 10*3/mm3 205     Results from last 7 days   Lab Units 02/17/22  1251   INR  1.02   APTT seconds 27.8     Results from last 7 days   Lab Units 02/17/22  1251   MAGNESIUM mg/dL 2.2                   Discharge Diet:    Dietary Orders (From admission, onward)     Start     Ordered    02/17/22 1813  Diet Regular; Consistent Carbohydrate  Diet Effective Now        Question Answer Comment   Diet Texture / Consistency Regular    Common Modifiers Consistent Carbohydrate        02/17/22 1812                  Activity at Discharge:      Discharge Medications     Discharge Medications      ASK your doctor about these medications      Instructions Start Date   glipizide 5 MG ER tablet  Commonly known as: GLUCOTROL XL  Ask about: Which instructions should I use?   5 mg, Oral, Daily      hydroCHLOROthiazide 25 MG tablet  Commonly known as: HYDRODIURIL   25 mg, Oral, Daily      quinapril 20 MG tablet  Commonly known as: ACCUPRIL   20 mg, Oral, Nightly             Discharge disposition: home    Follow-up Appointments    No future appointments.      Herber Ashraf MD  02/18/22  12:31 EST

## 2022-02-18 NOTE — PLAN OF CARE
Goal Outcome Evaluation:  Plan of Care Reviewed With: patient        Progress: improving   Pt admitted for complete heart block, had PPM placed today. Pt has some discomfort in incision but otherwise no complaints at this time,

## 2022-02-21 ENCOUNTER — NURSE TRIAGE (OUTPATIENT)
Dept: CALL CENTER | Facility: HOSPITAL | Age: 62
End: 2022-02-21

## 2022-02-21 NOTE — TELEPHONE ENCOUNTER
"Connected patient to Dr. Ashraf's office to make wound check appt. AVS only read to have wound check but did not provide address, phone number or who to contact.     Reason for Disposition  • [1] Caller has NON-URGENT question AND [2] triager unable to answer question    Additional Information  • Negative: Sounds like a life-threatening emergency to the triager  • Negative: Discharged in past month from the hospital with a diagnosis of chronic obstructive pulmonic disease (COPD)  • Negative: Discharged in past month from the hospital with a diagnosis of congestive heart failure (CHF) or heart failure (HF)  • Negative: Discharged in past month from the hospital with a diagnosis of heart attack (myocardial infarction)  • Negative: Discharged in past month from the hospital with a diagnosis of pneumonia  • Negative: [1] Post-op AND [2] incision symptoms or questions  • Negative: [1] Post-op AND [2] general symptoms or questions  • Negative: Pain, redness, swelling, or pus at IV Site  • Negative: IV not running or running slowly  • Negative: Symptoms arising from use of a urinary catheter (Gayle or Coude)  • Negative: Medication question  • Negative: [1] New symptom AND [2] not a possible complication of hospitalized condition  • Negative: Patient sounds very sick or weak to the triager  • Negative: Sounds like a serious complication to the triager  • Negative: [1] Caller has URGENT question AND [2] triager unable to answer question  • Negative: [1] Discharged from hospital within this past week AND [2] taking Coumadin (warfarin) AND [3] no INR testing performed within 5 days of discharge    Answer Assessment - Initial Assessment Questions  1. MAIN CONCERN OR SYMPTOM:  \"What is your main concern right now?\" \"What question do you have?\" \"What's the main symptom you're worried about?\" (e.g., breathing difficulty, ankle swelling, weight gain.)      To have wound check s/p pacemaker insertion  2. ONSET: \"When did the  " "this  start?\"      2/17/22  3. BETTER-SAME-WORSE: \"Are you getting better, staying the same, or getting worse compared to the day you were discharged?\"      same  4. HOSPITALIZATION: \"How long were you hospitalized?\" (e.g., days)      2/17/22  5. DISCHARGE DIAGNOSIS:  \"What problem or disease were you hospitalized for?\"      Complete heart block  6. DISCHARGE DATE: \"What date were you discharged from the hospital?\"      2/18/22  7. DISCHARGE DOCTOR: \"Who is the main doctor taking care of you now?\"      Cardiology is Dr. Herber Ashraf and she will follow with CHASE Hansen  8. DISCHARGE APPOINTMENT: \"Have you scheduled a follow-up discharge appointment with your doctor?\"      None listed just follow with CHASE  9. DISCHARGE MEDICATIONS: \"Did the physician who discharged you order any new medications for you to use? If yes, have you filled the prescription and started taking the medication?\"       She has all meds  10. PAIN: \"Is there any pain?\" If Yes, ask: \"How bad is it?\"  (Scale 1-10; or mild, moderate, severe)        Pain is controlled  11. FEVER: \"Do you have a fever?\" If Yes, ask: \"What is it, how was it measured  and when did it start?\"        No fever  12. OTHER SYMPTOMS: \"Do you have any other symptoms?\"        Site tender but not red    Protocols used: POST-HOSPITALIZATION FOLLOW-UP CALL-ADULT-      "

## 2022-02-21 NOTE — PAYOR COMM NOTE
"Bernard Swan (61 y.o. Female)                       ATTENTION;    DC SUMMARY CASE REF [id number]                Date of Birth Social Security Number Address Home Phone MRN    1960  [address] [address] [phone] [id number]    Tenriism Marital Status             None        Admission Date Admission Type Admitting Provider Attending Provider Department, Room/Bed    22 Emergency Herber Ashraf MD  [address] [facility],     Discharge Date Discharge Disposition Discharge Destination          2022 Home or Self Care              Attending Provider: (none)   Allergies: No Known Allergies    Isolation: None   Infection: None   Code Status: Not on file   Advance Care Planning Activity    Ht: 149.9 cm (59\")   Wt: 57.2 kg (126 lb)    Admission Cmt: None   Principal Problem: Complete heart block (HCC) [I44.2] More...                 Active Insurance as of 2022     Primary Coverage     Payor Plan Insurance Group Employer/Plan Group    KEY BENEFIT ADMINISTRATORS LBP [employer]EF[employer]AT[employer] LBP [id number]     Payor Plan Address Payor Plan Phone Number Payor Plan Fax Number Effective Dates    [address] [phone]  2022 - None Entered    [facility] [id number]       Subscriber Name Subscriber Birth Date Member ID       BERNARD SWAN 1960 [id number]                 Emergency Contacts      (Rel.) Home Phone Work Phone Mobile Phone    YAMIL ABREU (Daughter) [phone] -- --    DARIANNICK (Daughter) [phone] -- --               Discharge Summary      Herber Ashraf MD at 22 1231                                                                                                                DISCHARGE NOTE    Patient Name: Bernard Swan  Age/Sex: 61 y.o. female  : 1960  MRN: [id number]    Date of Discharge:  2022   Date of Admit: 2022  Encounter Provider: Herber Ashraf MD  [facility] of [facility] [place] [facility] CARDIOLOGY  Patient Care Team:  Provider, No Known as PCP - General    Subjective:     Discharge Diagnosis:    Complete heart block (HCC)    AV block, 3rd degree (HCC)      Hospital Course:   She came in for complete heart block that was symptomatic with dyspnea.  I took her to the lab and she had a pacemaker with a left bundle branch area lead.  She did well and will just be discharged  Vital Signs  Temp:  [97.8 °F (36.6 °C)-98.5 °F (36.9 °C)] 98.5 °F (36.9 °C)  Heart Rate:  [] 71  Resp:  [16-22] 16  BP: (117-156)/(64-98) 117/69    Intake/Output Summary (Last 24 hours) at 2/18/2022 1231  Last data filed at 2/18/2022 1146  Gross per 24 hour   Intake 1040 ml   Output --   Net 1040 ml       Physical Exam:  Eyes:      General:         Right eye: No discharge.         Left eye: No discharge.   HENT:      Head: Normocephalic and atraumatic.   Neck:      Thyroid: No thyromegaly.      Vascular: No JVD.   Pulmonary:      Effort: Pulmonary effort is normal.      Breath sounds: Normal breath sounds. No rales.   Cardiovascular:      Normal rate. Regular rhythm.      No gallop.   Edema:     Peripheral edema absent.   Abdominal:      General: Bowel sounds are normal.      Palpations: Abdomen is soft.      Tenderness: There is no abdominal tenderness.   Musculoskeletal: Normal range of motion.         General: No deformity. Skin:     General: Skin is warm and dry.      Findings: No erythema.   Neurological:      Mental Status: Alert and oriented to person, place, and time.      Motor: Normal muscle tone.   Psychiatric:         Behavior: Behavior normal.         Thought Content: Thought content normal.     Pacer site was okay.    Labs:   Results from last 7 days   Lab Units 02/18/22  0507 02/17/22  1251   SODIUM mmol/L 139 138   POTASSIUM mmol/L 3.7 3.9   CHLORIDE mmol/L 103 102   CO2 mmol/L 24.0 24.9   BUN mg/dL 18 27*   CREATININE mg/dL 0.85 1.04*   GLUCOSE mg/dL 93 174*   CALCIUM mg/dL 9.3  10.0   AST (SGOT) U/L  --  17   ALT (SGPT) U/L  --  23     Results from last 7 days   Lab Units 02/17/22  1251   TROPONIN T ng/mL <0.010     Results from last 7 days   Lab Units 02/17/22  1251   WBC 10*3/mm3 5.46   HEMOGLOBIN g/dL 14.3   HEMATOCRIT % 46.1   PLATELETS 10*3/mm3 205     Results from last 7 days   Lab Units 02/17/22  1251   INR  1.02   APTT seconds 27.8     Results from last 7 days   Lab Units 02/17/22  1251   MAGNESIUM mg/dL 2.2                   Discharge Diet:    Dietary Orders (From admission, onward)     Start     Ordered    02/17/22 1813  Diet Regular; Consistent Carbohydrate  Diet Effective Now        Question Answer Comment   Diet Texture / Consistency Regular    Common Modifiers Consistent Carbohydrate        02/17/22 1812                  Activity at Discharge:      Discharge Medications     Discharge Medications      ASK your doctor about these medications      Instructions Start Date   glipizide 5 MG ER tablet  Commonly known as: GLUCOTROL XL  Ask about: Which instructions should I use?   5 mg, Oral, Daily      hydroCHLOROthiazide 25 MG tablet  Commonly known as: HYDRODIURIL   25 mg, Oral, Daily      quinapril 20 MG tablet  Commonly known as: ACCUPRIL   20 mg, Oral, Nightly             Discharge disposition: home    Follow-up Appointments    No future appointments.      Gillian Rajan MD  02/18/22  12:31 EST        Electronically signed by Gillian Rajan MD at 02/18/22 1233       Discharge Order (From admission, onward)     Start     Ordered    02/18/22 1234  Discharge patient  Once        Expected Discharge Date: 02/18/22    Discharge Disposition: Home or Self Care    Physician of Record for Attribution - Please select from Treatment Team: GILLIAN RAJAN [[id number]]    Review needed by CMO to determine Physician of Record: No       Question Answer Comment   Physician of Record for Attribution - Please select from Treatment Team GILLIAN RAJAN    Review needed by CMO to determine Physician of  Record No        02/18/22 1235

## 2022-02-25 ENCOUNTER — CLINICAL SUPPORT NO REQUIREMENTS (OUTPATIENT)
Dept: CARDIOLOGY | Facility: CLINIC | Age: 62
End: 2022-02-25

## 2022-02-25 ENCOUNTER — TELEPHONE (OUTPATIENT)
Dept: CARDIOLOGY | Facility: CLINIC | Age: 62
End: 2022-02-25

## 2022-02-25 DIAGNOSIS — I44.2 COMPLETE HEART BLOCK: Primary | ICD-10-CM

## 2022-02-25 PROCEDURE — 93280 PM DEVICE PROGR EVAL DUAL: CPT | Performed by: INTERNAL MEDICINE

## 2022-02-25 NOTE — TELEPHONE ENCOUNTER
PT seen in office for 1 week ck. Implanted on 2/17/22. Had 25 AF events since then. Avail emgs mostly markers but appear true. Not on AC. [place] 1.4%    Did not save event list so unsure of longest event but was 120 total minutes of AF since implant.

## 2022-04-06 ENCOUNTER — CLINICAL SUPPORT NO REQUIREMENTS (OUTPATIENT)
Dept: CARDIOLOGY | Facility: CLINIC | Age: 62
End: 2022-04-06

## 2022-04-06 ENCOUNTER — OFFICE VISIT (OUTPATIENT)
Dept: CARDIOLOGY | Facility: CLINIC | Age: 62
End: 2022-04-06

## 2022-04-06 VITALS
WEIGHT: 126 LBS | HEART RATE: 100 BPM | DIASTOLIC BLOOD PRESSURE: 86 MMHG | BODY MASS INDEX: 25.4 KG/M2 | HEIGHT: 59 IN | SYSTOLIC BLOOD PRESSURE: 132 MMHG

## 2022-04-06 DIAGNOSIS — I47.1 ATRIAL TACHYCARDIA: ICD-10-CM

## 2022-04-06 DIAGNOSIS — I44.2 COMPLETE HEART BLOCK: Primary | ICD-10-CM

## 2022-04-06 DIAGNOSIS — Z95.0 PRESENCE OF CARDIAC PACEMAKER: ICD-10-CM

## 2022-04-06 PROBLEM — I47.19 ATRIAL TACHYCARDIA: Status: ACTIVE | Noted: 2022-04-06

## 2022-04-06 PROCEDURE — 99024 POSTOP FOLLOW-UP VISIT: CPT | Performed by: NURSE PRACTITIONER

## 2022-04-06 PROCEDURE — 93280 PM DEVICE PROGR EVAL DUAL: CPT | Performed by: INTERNAL MEDICINE

## 2022-04-06 PROCEDURE — 93000 ELECTROCARDIOGRAM COMPLETE: CPT | Performed by: NURSE PRACTITIONER

## 2022-04-06 NOTE — PROGRESS NOTES
Date of Office Visit: 2022  Encounter Provider: CHASE Parker  Place of Service[facility]  Patient Name: Ayanna Nicole  :1960    Chief Complaint   Patient presents with   • complete heart block     1 month BHE f/u    • AV block, 3rd degree   • Pacemaker Check   :     HPI: Ayanna Nicole is a 61 y.o. female who Dr. Ashraf and I initially saw in the ED on  when she presented with CHB. She had been to her PCP's office and they noted a low HR and did EKG>sent to ED.     She does have HTN and DM.      S/p dual chamber pacemaker.     Presents today for follow up and device check.     She is doing well, she feels great! No chest pain, dyspnea, PND, orthopnea, dizziness, palps or edema.     Device interrogation shows normal testing and function---615 AT/AF events--4.8% burden---most episodes only have markers but few egms---reviewed, all look like AT---she is asymptomatic.      Past Medical History:   Diagnosis Date   • AV block, 3rd degree (HCC)    • Complete heart block (HCC)        Past Surgical History:   Procedure Laterality Date   • CARDIAC ELECTROPHYSIOLOGY PROCEDURE N/A 2022    Procedure: Pacemaker DC new--MDT;  Surgeon: Herber Ashraf MD;  Location: [facility] INVASIVE LOCATION;  Service: Cardiology;  Laterality: N/A;       Social History     Socioeconomic History   • Marital status:    Tobacco Use   • Smoking status: Former Smoker     Quit date: 1990     Years since quittin.1   • Smokeless tobacco: Never Used   Vaping Use   • Vaping Use: Never used   Substance and Sexual Activity   • Alcohol use: Not Currently   • Drug use: Never   • Sexual activity: Defer     Partners: Male       Family History   Problem Relation Age of Onset   • Diabetes Mother    • Heart disease Father        Review of Systems   Constitutional: Negative for chills, fever and malaise/fatigue.   Cardiovascular: Negative for chest pain, dyspnea on exertion,  "leg swelling, near-syncope, orthopnea, palpitations, paroxysmal nocturnal dyspnea and syncope.   Respiratory: Negative for cough and shortness of breath.    Musculoskeletal: Negative for joint pain, joint swelling and myalgias.   Gastrointestinal: Negative for abdominal pain, diarrhea, melena, nausea and vomiting.   Genitourinary: Negative for frequency and hematuria.   Neurological: Negative for light-headedness, numbness, paresthesias and seizures.   Allergic/Immunologic: Negative.    All other systems reviewed and are negative.      No Known Allergies      Current Outpatient Medications:   •  glipizide (GLUCOTROL XL) 5 MG ER tablet, Take 5 mg by mouth Daily., Disp: , Rfl:   •  hydroCHLOROthiazide (HYDRODIURIL) 25 MG tablet, Take 25 mg by mouth Daily., Disp: , Rfl:   •  quinapril (ACCUPRIL) 20 MG tablet, Take 20 mg by mouth Every Night., Disp: , Rfl:   •  metoprolol tartrate (LOPRESSOR) 25 MG tablet, Take 0.5 tablets by mouth 2 (Two) Times a Day., Disp: 30 tablet, Rfl: 5      Objective:     Vitals:    04/06/22 1317   BP: 132/86   Pulse: 100   Weight: 57.2 kg (126 lb)   Height: 149.9 cm (59\")     Body mass index is 25.45 kg/m².    PHYSICAL EXAM:    Vitals Reviewed.   General Appearance: No acute distress, well developed and well nourished.   Eyes: Conjunctiva and lids: No erythema, swelling, or discharge. Sclera non-icteric.   HENT: Atraumatic, normocephalic. External eyes, ears, and nose normal.   Respiratory: No signs of respiratory distress. Respiration rhythm and depth normal.   Clear to auscultation. No rales, crackles, rhonchi, or wheezing auscultated.   Cardiovascular:  Heart Rate and Rhythm: Normal, Heart Sounds: Normal S1 and S2. No S3 or S4 noted.  Murmurs: No murmurs noted. No rubs, thrills, or gallops.   Arterial Pulses:  Posterior tibialis and dorsalis pedis pulses normal.   Lower Extremities: No edema noted.  Gastrointestinal:  Abdomen soft, non-distended, non-tender.   Musculoskeletal: Normal " movement of extremities  Skin: Warm and dry.   Psychiatric: Patient alert and oriented to person, place, and time. Speech and behavior appropriate. Normal mood and affect.       ECG 12 Lead    Date/Time: 4/6/2022 1:46 PM  Performed by: Jade Mendez APRN  Authorized by: Jade Mendez APRN   Comparison: compared with previous ECG   Similar to previous ECG  Rhythm: paced  BPM: 100  Pacing: atrial sensed rhythm, ventricular paced rhythm and 100% capture            Assessment:       Diagnosis Plan   1. Complete heart block (HCC)  ECG 12 Lead   2. Presence of cardiac pacemaker  ECG 12 Lead   3. Atrial tachycardia (HCC)            Plan:       1.-2. CHB, s/p PPM--normal testing and function.    3. AT---4.7% burden---she is asymptomatic, reviewed with Dr. Ashraf---will try adding little low dose metoprolol.     Follow up with Dr. Ashraf in 3 months with device check.     As always, it has been a pleasure to participate in your patient's care.      Sincerely,         CHASE Nava

## 2022-07-06 ENCOUNTER — CLINICAL SUPPORT NO REQUIREMENTS (OUTPATIENT)
Dept: CARDIOLOGY | Facility: CLINIC | Age: 62
End: 2022-07-06

## 2022-07-06 ENCOUNTER — OFFICE VISIT (OUTPATIENT)
Dept: CARDIOLOGY | Facility: CLINIC | Age: 62
End: 2022-07-06

## 2022-07-06 VITALS
HEIGHT: 59 IN | HEART RATE: 82 BPM | SYSTOLIC BLOOD PRESSURE: 130 MMHG | BODY MASS INDEX: 25.8 KG/M2 | DIASTOLIC BLOOD PRESSURE: 78 MMHG | WEIGHT: 128 LBS

## 2022-07-06 DIAGNOSIS — I44.2 COMPLETE HEART BLOCK: Primary | ICD-10-CM

## 2022-07-06 DIAGNOSIS — I47.1 ATRIAL TACHYCARDIA: ICD-10-CM

## 2022-07-06 DIAGNOSIS — I10 ESSENTIAL (PRIMARY) HYPERTENSION: ICD-10-CM

## 2022-07-06 PROCEDURE — 93280 PM DEVICE PROGR EVAL DUAL: CPT | Performed by: INTERNAL MEDICINE

## 2022-07-06 PROCEDURE — 99214 OFFICE O/P EST MOD 30 MIN: CPT | Performed by: INTERNAL MEDICINE

## 2022-07-06 PROCEDURE — 93000 ELECTROCARDIOGRAM COMPLETE: CPT | Performed by: INTERNAL MEDICINE

## 2022-07-06 NOTE — PROGRESS NOTES
Date of Office Visit: 2022  Encounter Provider: Herber Ashraf MD  Place of Service: [facility] CARDIOLOGY  Patient Name: Ayanna Nicole  : 1960    Subjective:     Encounter Date:2022      Patient ID: Ayanna Nicole is a 62 y.o. female who has a cc of  Complete heart block. I put in LBB area pacer in Feb. She feels great.     Much more energy.     No anginal chest pain,   No sig peraza,   No soa,   No fainting,  No orthostasis.   No edema.   Exercise tolerance: good.     There have been no hospital admission since the last visit.     There have been no bleeding events.       Past Medical History:   Diagnosis Date   • AV block, 3rd degree (HCC)    • Complete heart block (HCC)        Social History     Socioeconomic History   • Marital status:    Tobacco Use   • Smoking status: Former Smoker     Quit date: 1990     Years since quittin.4   • Smokeless tobacco: Never Used   Vaping Use   • Vaping Use: Never used   Substance and Sexual Activity   • Alcohol use: Not Currently   • Drug use: Never   • Sexual activity: Defer     Partners: Male       Family History   Problem Relation Age of Onset   • Diabetes Mother    • Heart disease Father        Review of Systems   Constitutional: Negative for fever and night sweats.   HENT: Negative for ear pain and stridor.    Eyes: Negative for discharge and visual halos.   Cardiovascular: Negative for cyanosis.   Respiratory: Negative for hemoptysis and sputum production.    Hematologic/Lymphatic: Negative for adenopathy.   Skin: Negative for nail changes and unusual hair distribution.   Musculoskeletal: Negative for gout and joint swelling.   Gastrointestinal: Negative for bowel incontinence and flatus.   Genitourinary: Negative for dysuria and flank pain.   Neurological: Negative for seizures and tremors.   Psychiatric/Behavioral: Negative for altered mental status. The patient is not nervous/anxious.             Objective:  "    Vitals:    07/06/22 1257   BP: 130/78   Pulse: 82   Weight: 58.1 kg (128 lb)   Height: 149.9 cm (59\")         Eyes:      General:         Right eye: No discharge.         Left eye: No discharge.   HENT:      Head: Normocephalic and atraumatic.   Neck:      Thyroid: No thyromegaly.      Vascular: No JVD.   Pulmonary:      Effort: Pulmonary effort is normal.      Breath sounds: Normal breath sounds. No rales.   Cardiovascular:      Normal rate. Regular rhythm.      No gallop.   Edema:     Peripheral edema absent.   Abdominal:      General: Bowel sounds are normal.      Palpations: Abdomen is soft.      Tenderness: There is no abdominal tenderness.   Musculoskeletal: Normal range of motion.         General: No deformity. Skin:     General: Skin is warm and dry.      Findings: No erythema.   Neurological:      Mental Status: Alert and oriented to person, place, and time.      Motor: Normal muscle tone.   Psychiatric:         Behavior: Behavior normal.         Thought Content: Thought content normal.           ECG 12 Lead    Date/Time: 7/6/2022 1:40 PM  Performed by: Herber Ashraf MD  Authorized by: Herber Ashraf MD   Comparison: compared with previous ECG   Similar to previous ECG  Rhythm: sinus rhythm and paced            Lab Review:       Assessment:          Diagnosis Plan   1. Complete heart block (HCC)     2. Atrial tachycardia (HCC)     3. Essential (primary) hypertension            Plan:     Heart block -- pacer looks amazing. Narrow QRS w RBB.norphology.     She is having some AT episodes but these were NOT AF. It was oversensing of V stim. I decreased her atrial sensitivity from 0.3 to 0.6.     HTN -- controlled on meds        "

## 2023-03-13 ENCOUNTER — OFFICE VISIT (OUTPATIENT)
Dept: CARDIOLOGY | Facility: CLINIC | Age: 63
End: 2023-03-13
Payer: COMMERCIAL

## 2023-03-13 ENCOUNTER — CLINICAL SUPPORT NO REQUIREMENTS (OUTPATIENT)
Dept: CARDIOLOGY | Facility: CLINIC | Age: 63
End: 2023-03-13
Payer: COMMERCIAL

## 2023-03-13 VITALS
HEIGHT: 59 IN | SYSTOLIC BLOOD PRESSURE: 128 MMHG | HEART RATE: 83 BPM | DIASTOLIC BLOOD PRESSURE: 86 MMHG | WEIGHT: 130 LBS | BODY MASS INDEX: 26.21 KG/M2

## 2023-03-13 DIAGNOSIS — I44.2 COMPLETE HEART BLOCK: Primary | ICD-10-CM

## 2023-03-13 DIAGNOSIS — I10 ESSENTIAL (PRIMARY) HYPERTENSION: ICD-10-CM

## 2023-03-13 DIAGNOSIS — I47.1 ATRIAL TACHYCARDIA: ICD-10-CM

## 2023-03-13 DIAGNOSIS — Z95.0 PRESENCE OF CARDIAC PACEMAKER: ICD-10-CM

## 2023-03-13 PROCEDURE — 93000 ELECTROCARDIOGRAM COMPLETE: CPT | Performed by: NURSE PRACTITIONER

## 2023-03-13 PROCEDURE — 93280 PM DEVICE PROGR EVAL DUAL: CPT | Performed by: INTERNAL MEDICINE

## 2023-03-13 PROCEDURE — 99213 OFFICE O/P EST LOW 20 MIN: CPT | Performed by: NURSE PRACTITIONER

## 2023-03-13 NOTE — PROGRESS NOTES
Date of Office Visit: 2023  Encounter Provider: CHASE Parker  Place of Service: [facility] CARDIOLOGY  Patient Name: Ayanna Nicole  :1960    Chief Complaint   Patient presents with   • complete heart block   • atrial tachycardia   • Hypertension          • Pacemaker Check   :     HPI: Ayanna Nicole is a 62 y.o. female who follows with Dr. Ashraf--presented in 2022 with CHB, s/p PPM. She also has HTN and DM.     Presents for routine follow up and device check.     Doing well. No chest pain, dyspnea, PND, orthopnea or edema.     Device interrogation shows normal testing and function. A paced 22%, V paced 100%. No arrhythmias.        Past Medical History:   Diagnosis Date   • AV block, 3rd degree (HCC)    • Complete heart block (HCC)        Past Surgical History:   Procedure Laterality Date   • CARDIAC ELECTROPHYSIOLOGY PROCEDURE N/A 2022    Procedure: Pacemaker DC new--MDT;  Surgeon: Herber Ashraf MD;  Location: [facility] INVASIVE LOCATION;  Service: Cardiology;  Laterality: N/A;       Social History     Socioeconomic History   • Marital status:    Tobacco Use   • Smoking status: Former     Types: Cigarettes     Quit date: 1990     Years since quittin.0   • Smokeless tobacco: Never   Vaping Use   • Vaping Use: Never used   Substance and Sexual Activity   • Alcohol use: Not Currently   • Drug use: Never   • Sexual activity: Defer     Partners: Male       Family History   Problem Relation Age of Onset   • Diabetes Mother    • Heart disease Father        Review of Systems   Constitutional: Negative for chills, fever and malaise/fatigue.   Cardiovascular: Negative for chest pain, dyspnea on exertion, leg swelling, near-syncope, orthopnea, palpitations, paroxysmal nocturnal dyspnea and syncope.   Respiratory: Negative for cough and shortness of breath.    Musculoskeletal: Negative for joint pain, joint swelling and myalgias.  "  Gastrointestinal: Negative for abdominal pain, diarrhea, melena, nausea and vomiting.   Genitourinary: Negative for frequency and hematuria.   Neurological: Negative for light-headedness, numbness, paresthesias and seizures.   Allergic/Immunologic: Negative.    All other systems reviewed and are negative.      No Known Allergies      Current Outpatient Medications:   •  glipizide (GLUCOTROL XL) 5 MG ER tablet, Take 1 tablet by mouth Daily., Disp: , Rfl:   •  hydroCHLOROthiazide (HYDRODIURIL) 25 MG tablet, Take 1 tablet by mouth Daily., Disp: , Rfl:   •  metoprolol tartrate (LOPRESSOR) 25 MG tablet, TAKE 1/2 TABLET BY MOUTH TWICE DAILY, Disp: 30 tablet, Rfl: 5  •  quinapril (ACCUPRIL) 20 MG tablet, Take 1 tablet by mouth Every Night., Disp: , Rfl:       Objective:     Vitals:    03/13/23 1332   BP: 128/86   Pulse: 83   Weight: 59 kg (130 lb)   Height: 149.9 cm (59\")     Body mass index is 26.26 kg/m².    PHYSICAL EXAM:    Vitals Reviewed.   General Appearance: No acute distress, well developed and well nourished.   Eyes: Conjunctiva and lids: No erythema, swelling, or discharge. Sclera non-icteric.   HENT: Atraumatic, normocephalic. External eyes, ears, and nose normal.   Respiratory: No signs of respiratory distress. Respiration rhythm and depth normal.   Clear to auscultation. No rales, crackles, rhonchi, or wheezing auscultated.   Cardiovascular:  Heart Rate and Rhythm: Normal, Heart Sounds: Normal S1 and S2. No S3 or S4 noted.  Murmurs: No murmurs noted. No rubs, thrills, or gallops.   Arterial Pulses:  Posterior tibialis and dorsalis pedis pulses normal.   Lower Extremities: No edema noted.  Gastrointestinal:  Abdomen soft, non-distended, non-tender.   Musculoskeletal: Normal movement of extremities  Skin: Warm and dry.   Psychiatric: Patient alert and oriented to person, place, and time. Speech and behavior appropriate. Normal mood and affect.       ECG 12 Lead    Date/Time: 3/13/2023 1:38 PM  Performed by: " Jade Mendez APRN  Authorized by: Jade Mendez APRN   Comparison: compared with previous ECG   Similar to previous ECG  Rhythm: paced  BPM: 83  Pacing: dual chamber pacing and 100% capture            Assessment:       Diagnosis Plan   1. Complete heart block (HCC)        2. Presence of cardiac pacemaker        3. Atrial tachycardia (HCC)        4. Essential (primary) hypertension               Plan:       1.-2. CHB, s/p PPM, AT--normal testing and function, no arrhythmias. She had not been doing remotes due to cost--she is going to check with insurance and billing. Explained that these are very important--verbalized understanding.     4. HTN, controlled.     Follow up with Dr. Ashraf     As always, it has been a pleasure to participate in your patient's care.      Sincerely,         CHASE Nava

## 2024-01-19 ENCOUNTER — TELEPHONE (OUTPATIENT)
Dept: CARDIOLOGY | Facility: CLINIC | Age: 64
End: 2024-01-19

## 2024-01-19 NOTE — TELEPHONE ENCOUNTER
Caller: Ayanna Nicole    Relationship to patient: Self    Best call back number: [phone]    Chief complaint: RESCHEDULE    Type of visit: DEVICE CHECK AND FOLLOW UP    Requested date: WHEN AVAILABLE     If rescheduling, when is the original appointment: 10/23/23    Additional notes: PT IS CALLING TO RESCHEDULE APPT FOR AROUND 11AM-12PM

## 2024-03-01 ENCOUNTER — OFFICE VISIT (OUTPATIENT)
Age: 64
End: 2024-03-01
Payer: COMMERCIAL

## 2024-03-01 ENCOUNTER — CLINICAL SUPPORT NO REQUIREMENTS (OUTPATIENT)
Age: 64
End: 2024-03-01
Payer: COMMERCIAL

## 2024-03-01 VITALS
HEIGHT: 59 IN | SYSTOLIC BLOOD PRESSURE: 118 MMHG | DIASTOLIC BLOOD PRESSURE: 74 MMHG | HEART RATE: 81 BPM | BODY MASS INDEX: 27.21 KG/M2 | WEIGHT: 135 LBS

## 2024-03-01 DIAGNOSIS — Z95.0 PRESENCE OF CARDIAC PACEMAKER: ICD-10-CM

## 2024-03-01 DIAGNOSIS — I44.2 COMPLETE HEART BLOCK: Primary | ICD-10-CM

## 2024-03-01 DIAGNOSIS — I47.19 ATRIAL TACHYCARDIA: ICD-10-CM

## 2024-03-01 DIAGNOSIS — I10 ESSENTIAL (PRIMARY) HYPERTENSION: ICD-10-CM

## 2024-03-01 PROCEDURE — 93000 ELECTROCARDIOGRAM COMPLETE: CPT | Performed by: NURSE PRACTITIONER

## 2024-03-01 PROCEDURE — 99213 OFFICE O/P EST LOW 20 MIN: CPT | Performed by: NURSE PRACTITIONER

## 2024-03-01 NOTE — PROGRESS NOTES
Date of Office Visit: 2024  Encounter Provider: CHASE Parker  Place of Service: [facility] CARDIOLOGY  Patient Name: Ayanna Nicole  :1960    Chief Complaint   Patient presents with    complete heart block    atrial tachycardia    Pacemaker Check   :     HPI: Ayanna Nicole is a 63 y.o. female who follows with Dr. Ashraf---presented 2022 with CHB, s/p PPM. She also has HTN and DM.     Presents for routine follow up and device check.     She is doing well. No complaints of chest pain, dyspnea, PND, orthopnea or edema.     Device interrogations shows normal testing and function. A paced 16%, V paced 100% . She had once AT/AF episode in Oct--lasted 3 minutes, asymptomatic--just markers, no egms---looks regular--suspect AT.      Past Medical History:   Diagnosis Date    AV block, 3rd degree     Complete heart block        Past Surgical History:   Procedure Laterality Date    CARDIAC ELECTROPHYSIOLOGY PROCEDURE N/A 2022    Procedure: Pacemaker DC new--MDT;  Surgeon: Herber Ashraf MD;  Location: [facility] INVASIVE LOCATION;  Service: Cardiology;  Laterality: N/A;       Social History     Socioeconomic History    Marital status:    Tobacco Use    Smoking status: Former     Types: Cigarettes     Quit date: 1990     Years since quittin.0    Smokeless tobacco: Never   Vaping Use    Vaping Use: Never used   Substance and Sexual Activity    Alcohol use: Not Currently    Drug use: Never    Sexual activity: Defer     Partners: Male       Family History   Problem Relation Age of Onset    Diabetes Mother     Heart disease Father        Review of Systems   Constitutional: Negative for chills, fever and malaise/fatigue.   Cardiovascular:  Negative for chest pain, dyspnea on exertion, leg swelling, near-syncope, orthopnea, palpitations, paroxysmal nocturnal dyspnea and syncope.   Respiratory:  Negative for cough and shortness of breath.    Musculoskeletal:   "Negative for joint pain, joint swelling and myalgias.   Gastrointestinal:  Negative for abdominal pain, diarrhea, melena, nausea and vomiting.   Genitourinary:  Negative for frequency and hematuria.   Neurological:  Negative for light-headedness, numbness, paresthesias and seizures.   Allergic/Immunologic: Negative.    All other systems reviewed and are negative.      No Known Allergies      Current Outpatient Medications:     glipizide (GLUCOTROL XL) 5 MG ER tablet, Take 1 tablet by mouth Daily., Disp: , Rfl:     hydroCHLOROthiazide (HYDRODIURIL) 25 MG tablet, Take 1 tablet by mouth Daily., Disp: , Rfl:     metoprolol tartrate (LOPRESSOR) 25 MG tablet, Take 0.5 tablets by mouth 2 (Two) Times a Day., Disp: 90 tablet, Rfl: 3    quinapril (ACCUPRIL) 20 MG tablet, Take 1 tablet by mouth Every Night., Disp: , Rfl:       Objective:     Vitals:    03/01/24 1325   BP: 118/74   Pulse: 81   Weight: 61.2 kg (135 lb)   Height: 149.9 cm (59\")     Body mass index is 27.27 kg/m².    PHYSICAL EXAM:    Vitals Reviewed.   General Appearance: No acute distress, well developed and well nourished.   HENT: Atraumatic, normocephalic. External eyes, ears, and nose normal.   Respiratory: No signs of respiratory distress. Respiration rhythm and depth normal.   Clear to auscultation. No rales, crackles, rhonchi, or wheezing auscultated.   Cardiovascular:  Heart Rate and Rhythm: Normal, Heart Sounds: Normal S1 and S2.   Murmurs: No murmurs noted. No rubs, thrills, or gallops.   Lower Extremities: No edema noted.  Gastrointestinal:  Abdomen soft, non-distended, non-tender.   Musculoskeletal: Normal movement of extremities  Skin: Warm and dry.   Psychiatric: Patient alert and oriented to person, place, and time. Speech and behavior appropriate. Normal mood and affect.       ECG 12 Lead    Date/Time: 3/1/2024 1:33 PM  Performed by: Jade Mendez APRN    Authorized by: Jade Mendez APRN  Comparison: compared with previous ECG   Similar to " previous ECG  Rhythm: paced  BPM: 81  Pacing: dual chamber pacing and 100% capture          Assessment:       Diagnosis Plan   1. Complete heart block  ECG 12 Lead      2. Presence of cardiac pacemaker  ECG 12 Lead      3. Essential (primary) hypertension        4. Atrial tachycardia               Plan:       1.-2. CHB, s/p PPM---normal testing and function.     3. HTN, controlled.     4. AT--1 brief episode in Oct--asymptomatic, continue to monitor.     Follow up with Dr. Ashraf in 6 months w/device check.     As always, it has been a pleasure to participate in your patient's care.      Sincerely,         CHASE Nava

## 2025-04-01 NOTE — PROGRESS NOTES
"      ELECTROPHYSIOLOGY   Date of Office Visit: 2025  Patient Name: Ayanna Nicole  : 1960  Encounter Provider: Brian Tubbs PA-C  Electrophysiologist: Dr. Ashraf  CHIEF COMPLAINT / REASON FOR OFFICE VISIT     complete heart block, atrial tachycardia, and Pacemaker Check  1 year follow-up    HISTORY OF PRESENT ILLNESS     This is a 64 y.o. year old female who presents to [facility] CARDIOLOGY for a 1 year follow up of cardiovascular health.     She has a history of a complete heart block with previous dual-chamber LB-  RV lead pacemaker implantation 2022.    Device interrogation today shows normal device function with 0.1% AT/AF burden.  23.4% atrial paced beats and 100% RV paced beats.  No new episodes or events. She is dependent.     Today the patient reports she has been doing really well over the past year.  She is keeping up with all of her normal activities.  She feels like she went through a little bit of a depression around the time that her  passed every year but now is feeling better.    She denies any episodes of dizziness, lightheadedness or near syncope.  Her blood pressures been really well-controlled and she does not intermittently check her blood sugars at home.  Admittedly they have been up and down but she is working on this.    PMHx: Complete heart block s/p pacemaker, AT, hypertension, diabetes mellitus type 2    PHYSICAL EXAMINATION     Vital Signs:  /72 (BP Location: Left arm, Patient Position: Sitting)   Pulse 75   Ht 149.9 cm (59\")   Wt 57.6 kg (127 lb)   BMI 25.65 kg/m²   Estimated body mass index is 25.65 kg/m² as calculated from the following:    Height as of this encounter: 149.9 cm (59\").    Weight as of this encounter: 57.6 kg (127 lb).             Physical Exam  Constitutional:       Appearance: Normal appearance.   HENT:      Head: Normocephalic and atraumatic.   Cardiovascular:      Rate and Rhythm: Normal rate and regular " "rhythm.      Pulses: Normal pulses.      Heart sounds: Normal heart sounds.   Pulmonary:      Effort: Pulmonary effort is normal.      Breath sounds: Normal breath sounds.   Musculoskeletal:      Right lower leg: No edema.      Left lower leg: No edema.   Skin:     General: Skin is warm and dry.   Neurological:      General: No focal deficit present.      Mental Status: She is alert and oriented to person, place, and time.          Cardiac Testing/Results     Cardiac Testing:   - Echo 2/2022: EF 67.7% with normal LV size and function.  Normal diastolic function.  No significant valvular heart disease.    Result Review :  The following data was reviewed by: Brian Tubbs PA-C on 04/02/2025:       Lab Results   Component Value Date     02/18/2022     02/17/2022    K 3.7 02/18/2022    K 3.9 02/17/2022     02/18/2022     02/17/2022    CO2 24.0 02/18/2022    CO2 24.9 02/17/2022    BUN 18 02/18/2022    BUN 27 (H) 02/17/2022    CREATININE 0.85 02/18/2022    CREATININE 1.04 (H) 02/17/2022    EGFRIFAFRI 82 02/18/2022    EGFRIFAFRI 65 02/17/2022    GLUCOSE 93 02/18/2022    GLUCOSE 174 (H) 02/17/2022    CALCIUM 9.3 02/18/2022    CALCIUM 10.0 02/17/2022    ALBUMIN 5.00 02/17/2022    AST 17 02/17/2022    ALT 23 02/17/2022     Lab Results   Component Value Date    WBC 5.46 02/17/2022    HGB 14.3 02/17/2022    HCT 46.1 02/17/2022    MCV 88.0 02/17/2022     02/17/2022     No results found for: \"PROBNP\", \"BNP\"  Lab Results   Component Value Date    TROPONINT <0.010 02/17/2022     No results found for: \"TSH\"              ECG 12 Lead    Date/Time: 4/2/2025 9:41 AM  Performed by: Brian Tubbs PA-C    Authorized by: Winchester-Coon, Brian R, PA-C  Comparison: compared with previous ECG from 3/1/2024  Rhythm: paced  Rate: normal  BPM: 75  QRS axis: left  Comments: A sensed V paced rhythm with no new T wave changes.                ASSESSMENT & PLAN       Diagnoses and all orders for this " visit:    1-2. Complete heart block (Primary), Presence of cardiac pacemaker  S/p dual-chamber left bundle RV lead pacer 2/2022  She has normal device function today and is dependent on interrogation  She has had no new episodes or events and has 23.4% atrial paced beats and 100% RV paced beats.  She has been feeling great since implant and has no complaints.  Will plan to see her in 1 year    3. Atrial tachycardia  Noted in the past.  No significant events noted on pacemaker.  She is taking verapamil which will help suppress any symptoms as well as metoprolol  If she has any symptomatic palpitation she will contact the office    4. Essential (primary) hypertension  Blood pressure was elevated but her internist placed her on verapamil and since then her blood pressures been very well-controlled.  She will continue to try to get more active as the summer comes.      Follow Up:  Return in about 1 year (around 4/2/2026) for Dr. Ashraf- Routine, Device check.  Patient was given instructions and counseling regarding her condition or for health maintenance advice. Please contact office if worsening symptoms or proceed to ER when appropriate.      Brian Tubbs PA-C  04/02/25  09:50 EDT    MEDICATIONS         Discharge Medications            Accurate as of April 2, 2025  9:50 AM. If you have any questions, ask your nurse or doctor.                Continue These Medications        Instructions Start Date   atorvastatin 40 MG tablet  Commonly known as: LIPITOR   40 mg, Every Night at Bedtime      hydroCHLOROthiazide 25 MG tablet   25 mg, Daily      Januvia 50 MG tablet  Generic drug: SITagliptin   1 tablet, Daily      metoprolol tartrate 25 MG tablet  Commonly known as: LOPRESSOR   12.5 mg, Oral, Daily      quinapril 20 MG tablet  Commonly known as: ACCUPRIL   20 mg, Nightly      verapamil  MG CR tablet  Commonly known as: CALAN-SR   180 mg, 2 Times Daily             Stop These Medications      glipizide 5 MG ER  tablet  Commonly known as: GLUCOTROL XL  Stopped by: Brian Tubbs                  **Haley Disclaimer: This note was dictated using an electronic transcription. The electronic translation of spoken language may permit erroneous, or at times, nonsensical words or phrases to be inadvertently transcribed. Although I have reviewed the note for such errors, some may still exist.

## 2025-04-02 ENCOUNTER — CLINICAL SUPPORT NO REQUIREMENTS (OUTPATIENT)
Age: 65
End: 2025-04-02
Payer: COMMERCIAL

## 2025-04-02 ENCOUNTER — OFFICE VISIT (OUTPATIENT)
Age: 65
End: 2025-04-02
Payer: COMMERCIAL

## 2025-04-02 VITALS
HEIGHT: 59 IN | HEART RATE: 75 BPM | WEIGHT: 127 LBS | SYSTOLIC BLOOD PRESSURE: 118 MMHG | DIASTOLIC BLOOD PRESSURE: 72 MMHG | BODY MASS INDEX: 25.6 KG/M2

## 2025-04-02 DIAGNOSIS — I47.19 ATRIAL TACHYCARDIA: ICD-10-CM

## 2025-04-02 DIAGNOSIS — Z95.0 PRESENCE OF CARDIAC PACEMAKER: Primary | ICD-10-CM

## 2025-04-02 DIAGNOSIS — Z95.0 PRESENCE OF CARDIAC PACEMAKER: ICD-10-CM

## 2025-04-02 DIAGNOSIS — I44.2 COMPLETE HEART BLOCK: Primary | ICD-10-CM

## 2025-04-02 DIAGNOSIS — I10 ESSENTIAL (PRIMARY) HYPERTENSION: ICD-10-CM

## 2025-04-02 PROCEDURE — 93000 ELECTROCARDIOGRAM COMPLETE: CPT | Performed by: PHYSICIAN ASSISTANT

## 2025-04-02 PROCEDURE — 99214 OFFICE O/P EST MOD 30 MIN: CPT | Performed by: PHYSICIAN ASSISTANT

## 2025-04-02 RX ORDER — VERAPAMIL HYDROCHLORIDE 180 MG/1
180 TABLET, FILM COATED, EXTENDED RELEASE ORAL 2 TIMES DAILY
COMMUNITY

## 2025-04-02 RX ORDER — ATORVASTATIN CALCIUM 40 MG/1
40 TABLET, FILM COATED ORAL
COMMUNITY

## 2025-04-02 RX ORDER — SITAGLIPTIN 50 MG/1
1 TABLET, FILM COATED ORAL DAILY
COMMUNITY
Start: 2025-03-27

## 2025-04-10 ENCOUNTER — TELEPHONE (OUTPATIENT)
Dept: CARDIOLOGY | Age: 65
End: 2025-04-10
Payer: COMMERCIAL

## 2025-04-10 NOTE — TELEPHONE ENCOUNTER
SDC pt LOV 4/2.    Patient has been prescribed cyclobenzaprine 10 mg TID PRN for back pain from the ER.  She shares that she has been taking about 1 pill per week for intermittent back pain.  Her PCP wanted her to get the ok that she can continue this from cardiac standpoint?    Yana Interiano RN  [facility] Triage  04/10/25 13:28 EDT

## 2025-04-10 NOTE — TELEPHONE ENCOUNTER
I called to let patient know.  She requests I have this faxed to her PCP at [facility] which I will do.    # [phone]    Yana Interiano RN  [facility] Triage  04/10/25 14:16 EDT

## 2025-06-26 RX ORDER — METOPROLOL TARTRATE 25 MG/1
12.5 TABLET, FILM COATED ORAL DAILY
Qty: 90 TABLET | Refills: 3 | Status: SHIPPED | OUTPATIENT
Start: 2025-06-26

## 2025-08-11 LAB
MC_CV_MDC_IDC_RATE_1: 150
MC_CV_MDC_IDC_RATE_1: 171
MC_CV_MDC_IDC_THERAPIES: NORMAL
MC_CV_MDC_IDC_ZONE_ID: 2
MC_CV_MDC_IDC_ZONE_ID: 6
MDC_IDC_MSMT_BATTERY_REMAINING_LONGEVITY: 101 MO
MDC_IDC_MSMT_BATTERY_RRT_TRIGGER: 2.62
MDC_IDC_MSMT_BATTERY_STATUS: NORMAL
MDC_IDC_MSMT_BATTERY_VOLTAGE: 2.99
MDC_IDC_MSMT_LEADCHNL_RA_DTM: NORMAL
MDC_IDC_MSMT_LEADCHNL_RA_IMPEDANCE_VALUE: 399
MDC_IDC_MSMT_LEADCHNL_RA_PACING_THRESHOLD_AMPLITUDE: 0.5
MDC_IDC_MSMT_LEADCHNL_RA_PACING_THRESHOLD_POLARITY: NORMAL
MDC_IDC_MSMT_LEADCHNL_RA_PACING_THRESHOLD_PULSEWIDTH: 0.4
MDC_IDC_MSMT_LEADCHNL_RA_SENSING_INTR_AMPL: 0.88
MDC_IDC_MSMT_LEADCHNL_RV_DTM: NORMAL
MDC_IDC_MSMT_LEADCHNL_RV_IMPEDANCE_VALUE: 380
MDC_IDC_MSMT_LEADCHNL_RV_PACING_THRESHOLD_AMPLITUDE: 0.62
MDC_IDC_MSMT_LEADCHNL_RV_PACING_THRESHOLD_POLARITY: NORMAL
MDC_IDC_MSMT_LEADCHNL_RV_PACING_THRESHOLD_PULSEWIDTH: 0.4
MDC_IDC_MSMT_LEADCHNL_RV_SENSING_INTR_AMPL: 15.25
MDC_IDC_PG_IMPLANT_DTM: NORMAL
MDC_IDC_PG_MFG: NORMAL
MDC_IDC_PG_MODEL: NORMAL
MDC_IDC_PG_SERIAL: NORMAL
MDC_IDC_PG_TYPE: NORMAL
MDC_IDC_SESS_DTM: NORMAL
MDC_IDC_SESS_TYPE: NORMAL
MDC_IDC_SET_BRADY_AT_MODE_SWITCH_RATE: 171
MDC_IDC_SET_BRADY_LOWRATE: 60
MDC_IDC_SET_BRADY_MAX_SENSOR_RATE: 130
MDC_IDC_SET_BRADY_MAX_TRACKING_RATE: 130
MDC_IDC_SET_BRADY_MODE: NORMAL
MDC_IDC_SET_BRADY_PAV_DELAY: 150
MDC_IDC_SET_BRADY_SAV_DELAY: 130
MDC_IDC_SET_LEADCHNL_RA_PACING_AMPLITUDE: 1.5
MDC_IDC_SET_LEADCHNL_RA_PACING_POLARITY: NORMAL
MDC_IDC_SET_LEADCHNL_RA_PACING_PULSEWIDTH: 0.4
MDC_IDC_SET_LEADCHNL_RA_SENSING_POLARITY: NORMAL
MDC_IDC_SET_LEADCHNL_RA_SENSING_SENSITIVITY: 0.6
MDC_IDC_SET_LEADCHNL_RV_PACING_AMPLITUDE: 2
MDC_IDC_SET_LEADCHNL_RV_PACING_POLARITY: NORMAL
MDC_IDC_SET_LEADCHNL_RV_PACING_PULSEWIDTH: 0.4
MDC_IDC_SET_LEADCHNL_RV_SENSING_POLARITY: NORMAL
MDC_IDC_SET_LEADCHNL_RV_SENSING_SENSITIVITY: 0.9
MDC_IDC_SET_ZONE_STATUS: NORMAL
MDC_IDC_SET_ZONE_STATUS: NORMAL
MDC_IDC_SET_ZONE_TYPE: NORMAL
MDC_IDC_SET_ZONE_TYPE: NORMAL
MDC_IDC_STAT_AT_BURDEN_PERCENT: 0
MDC_IDC_STAT_BRADY_RA_PERCENT_PACED: 13.51
MDC_IDC_STAT_BRADY_RV_PERCENT_PACED: 99.93

## 2025-08-22 LAB
MC_CV_MDC_IDC_RATE_1: 150
MC_CV_MDC_IDC_RATE_1: 171
MC_CV_MDC_IDC_THERAPIES: NORMAL
MC_CV_MDC_IDC_ZONE_ID: 2
MC_CV_MDC_IDC_ZONE_ID: 6
MDC_IDC_MSMT_BATTERY_REMAINING_LONGEVITY: 101 MO
MDC_IDC_MSMT_BATTERY_RRT_TRIGGER: 2.62
MDC_IDC_MSMT_BATTERY_STATUS: NORMAL
MDC_IDC_MSMT_BATTERY_VOLTAGE: 2.99
MDC_IDC_MSMT_LEADCHNL_RA_DTM: NORMAL
MDC_IDC_MSMT_LEADCHNL_RA_IMPEDANCE_VALUE: 399
MDC_IDC_MSMT_LEADCHNL_RA_PACING_THRESHOLD_AMPLITUDE: 0.5
MDC_IDC_MSMT_LEADCHNL_RA_PACING_THRESHOLD_POLARITY: NORMAL
MDC_IDC_MSMT_LEADCHNL_RA_PACING_THRESHOLD_PULSEWIDTH: 0.4
MDC_IDC_MSMT_LEADCHNL_RA_SENSING_INTR_AMPL: 0.88
MDC_IDC_MSMT_LEADCHNL_RV_DTM: NORMAL
MDC_IDC_MSMT_LEADCHNL_RV_IMPEDANCE_VALUE: 380
MDC_IDC_MSMT_LEADCHNL_RV_PACING_THRESHOLD_AMPLITUDE: 0.62
MDC_IDC_MSMT_LEADCHNL_RV_PACING_THRESHOLD_POLARITY: NORMAL
MDC_IDC_MSMT_LEADCHNL_RV_PACING_THRESHOLD_PULSEWIDTH: 0.4
MDC_IDC_MSMT_LEADCHNL_RV_SENSING_INTR_AMPL: 15.25
MDC_IDC_PG_IMPLANT_DTM: NORMAL
MDC_IDC_PG_MFG: NORMAL
MDC_IDC_PG_MODEL: NORMAL
MDC_IDC_PG_SERIAL: NORMAL
MDC_IDC_PG_TYPE: NORMAL
MDC_IDC_SESS_DTM: NORMAL
MDC_IDC_SESS_TYPE: NORMAL
MDC_IDC_SET_BRADY_AT_MODE_SWITCH_RATE: 171
MDC_IDC_SET_BRADY_LOWRATE: 60
MDC_IDC_SET_BRADY_MAX_SENSOR_RATE: 130
MDC_IDC_SET_BRADY_MAX_TRACKING_RATE: 130
MDC_IDC_SET_BRADY_MODE: NORMAL
MDC_IDC_SET_BRADY_PAV_DELAY: 150
MDC_IDC_SET_BRADY_SAV_DELAY: 130
MDC_IDC_SET_LEADCHNL_RA_PACING_AMPLITUDE: 1.5
MDC_IDC_SET_LEADCHNL_RA_PACING_POLARITY: NORMAL
MDC_IDC_SET_LEADCHNL_RA_PACING_PULSEWIDTH: 0.4
MDC_IDC_SET_LEADCHNL_RA_SENSING_POLARITY: NORMAL
MDC_IDC_SET_LEADCHNL_RA_SENSING_SENSITIVITY: 0.6
MDC_IDC_SET_LEADCHNL_RV_PACING_AMPLITUDE: 2
MDC_IDC_SET_LEADCHNL_RV_PACING_POLARITY: NORMAL
MDC_IDC_SET_LEADCHNL_RV_PACING_PULSEWIDTH: 0.4
MDC_IDC_SET_LEADCHNL_RV_SENSING_POLARITY: NORMAL
MDC_IDC_SET_LEADCHNL_RV_SENSING_SENSITIVITY: 0.9
MDC_IDC_SET_ZONE_STATUS: NORMAL
MDC_IDC_SET_ZONE_STATUS: NORMAL
MDC_IDC_SET_ZONE_TYPE: NORMAL
MDC_IDC_SET_ZONE_TYPE: NORMAL
MDC_IDC_STAT_AT_BURDEN_PERCENT: 0
MDC_IDC_STAT_BRADY_RA_PERCENT_PACED: 13.51
MDC_IDC_STAT_BRADY_RV_PERCENT_PACED: 99.93

## (undated) DEVICE — ADHS SKIN SURG TISS VISC PREMIERPRO EXOFIN HI/VISC FAST/DRY

## (undated) DEVICE — CATH GUIDE RIGHTSITE C315HIS-02

## (undated) DEVICE — GW INQWIRE FC PTFE J/3MM .035 180

## (undated) DEVICE — SLITTER CATH GUIDE ATTAIN ADJ

## (undated) DEVICE — INTRO SHEATH PRELUDE SNAP .038 9F 13CM W/SDPRT BLK

## (undated) DEVICE — PENCL E/S ULTRAVAC TELESCP NOSE HOLSTR 10FT

## (undated) DEVICE — LOU PACE DEFIB: Brand: MEDLINE INDUSTRIES, INC.

## (undated) DEVICE — INTRO SHEATH PRELUDE SNAP .038 7F 13CM W/SDPRT

## (undated) DEVICE — Device